# Patient Record
Sex: MALE | Race: WHITE | NOT HISPANIC OR LATINO | Employment: FULL TIME | URBAN - METROPOLITAN AREA
[De-identification: names, ages, dates, MRNs, and addresses within clinical notes are randomized per-mention and may not be internally consistent; named-entity substitution may affect disease eponyms.]

---

## 2019-02-12 ENCOUNTER — TRANSCRIBE ORDERS (OUTPATIENT)
Dept: ADMINISTRATIVE | Facility: HOSPITAL | Age: 49
End: 2019-02-12

## 2019-02-12 DIAGNOSIS — R10.11 RIGHT UPPER QUADRANT PAIN: Primary | ICD-10-CM

## 2019-02-21 ENCOUNTER — HOSPITAL ENCOUNTER (OUTPATIENT)
Dept: NUCLEAR MEDICINE | Facility: HOSPITAL | Age: 49
Discharge: HOME | End: 2019-02-21

## 2019-02-21 DIAGNOSIS — R10.11 RIGHT UPPER QUADRANT PAIN: ICD-10-CM

## 2019-02-21 PROCEDURE — 25010000002 SINCALIDE PER 5 MCG: Performed by: STUDENT IN AN ORGANIZED HEALTH CARE EDUCATION/TRAINING PROGRAM

## 2019-02-21 PROCEDURE — 78227 HEPATOBIL SYST IMAGE W/DRUG: CPT

## 2019-02-21 PROCEDURE — 0 TECHNETIUM TC 99M MEBROFENIN KIT: Performed by: STUDENT IN AN ORGANIZED HEALTH CARE EDUCATION/TRAINING PROGRAM

## 2019-02-21 PROCEDURE — A9537 TC99M MEBROFENIN: HCPCS | Performed by: STUDENT IN AN ORGANIZED HEALTH CARE EDUCATION/TRAINING PROGRAM

## 2019-02-21 RX ORDER — KIT FOR THE PREPARATION OF TECHNETIUM TC 99M MEBROFENIN 45 MG/10ML
1 INJECTION, POWDER, LYOPHILIZED, FOR SOLUTION INTRAVENOUS
Status: COMPLETED | OUTPATIENT
Start: 2019-02-21 | End: 2019-02-21

## 2019-02-21 RX ADMIN — SINCALIDE 2.5 MCG: 5 INJECTION, POWDER, LYOPHILIZED, FOR SOLUTION INTRAVENOUS at 09:35

## 2019-02-21 RX ADMIN — MEBROFENIN 1 DOSE: 45 INJECTION, POWDER, LYOPHILIZED, FOR SOLUTION INTRAVENOUS at 08:30

## 2019-04-15 ENCOUNTER — APPOINTMENT (OUTPATIENT)
Dept: PREADMISSION TESTING | Facility: HOSPITAL | Age: 49
End: 2019-04-15

## 2019-04-15 VITALS — WEIGHT: 281.97 LBS | HEIGHT: 74 IN | BODY MASS INDEX: 36.19 KG/M2

## 2019-04-15 LAB
ALBUMIN SERPL-MCNC: 4.2 G/DL (ref 3.5–5.2)
ALBUMIN/GLOB SERPL: 1.4 G/DL
ALP SERPL-CCNC: 73 U/L (ref 39–117)
ALT SERPL W P-5'-P-CCNC: 34 U/L (ref 1–41)
ANION GAP SERPL CALCULATED.3IONS-SCNC: 12 MMOL/L
AST SERPL-CCNC: 24 U/L (ref 1–40)
BILIRUB SERPL-MCNC: 0.4 MG/DL (ref 0.2–1.2)
BUN BLD-MCNC: 12 MG/DL (ref 6–20)
BUN/CREAT SERPL: 14 (ref 7–25)
CALCIUM SPEC-SCNC: 8.9 MG/DL (ref 8.6–10.5)
CHLORIDE SERPL-SCNC: 103 MMOL/L (ref 98–107)
CO2 SERPL-SCNC: 25 MMOL/L (ref 22–29)
CREAT BLD-MCNC: 0.86 MG/DL (ref 0.76–1.27)
DEPRECATED RDW RBC AUTO: 47.4 FL (ref 37–54)
ERYTHROCYTE [DISTWIDTH] IN BLOOD BY AUTOMATED COUNT: 13.9 % (ref 12.3–15.4)
GFR SERPL CREATININE-BSD FRML MDRD: 95 ML/MIN/1.73
GLOBULIN UR ELPH-MCNC: 3 GM/DL
GLUCOSE BLD-MCNC: 114 MG/DL (ref 65–99)
HCT VFR BLD AUTO: 46.9 % (ref 37.5–51)
HGB BLD-MCNC: 15.7 G/DL (ref 13–17.7)
MCH RBC QN AUTO: 31.3 PG (ref 26.6–33)
MCHC RBC AUTO-ENTMCNC: 33.5 G/DL (ref 31.5–35.7)
MCV RBC AUTO: 93.6 FL (ref 79–97)
PLATELET # BLD AUTO: 241 10*3/MM3 (ref 140–450)
PMV BLD AUTO: 9.2 FL (ref 6–12)
POTASSIUM BLD-SCNC: 4.1 MMOL/L (ref 3.5–5.2)
PROT SERPL-MCNC: 7.2 G/DL (ref 6–8.5)
RBC # BLD AUTO: 5.01 10*6/MM3 (ref 4.14–5.8)
SODIUM BLD-SCNC: 140 MMOL/L (ref 136–145)
WBC NRBC COR # BLD: 8.78 10*3/MM3 (ref 3.4–10.8)

## 2019-04-15 PROCEDURE — 80053 COMPREHEN METABOLIC PANEL: CPT | Performed by: SURGERY

## 2019-04-15 PROCEDURE — 36415 COLL VENOUS BLD VENIPUNCTURE: CPT

## 2019-04-15 PROCEDURE — 93010 ELECTROCARDIOGRAM REPORT: CPT | Performed by: INTERNAL MEDICINE

## 2019-04-15 PROCEDURE — 93005 ELECTROCARDIOGRAM TRACING: CPT

## 2019-04-15 PROCEDURE — 85027 COMPLETE CBC AUTOMATED: CPT | Performed by: SURGERY

## 2019-04-15 RX ORDER — CEFAZOLIN SODIUM 2 G/100ML
2 INJECTION, SOLUTION INTRAVENOUS ONCE
Status: CANCELLED | OUTPATIENT
Start: 2019-04-18

## 2019-04-15 RX ORDER — OMEPRAZOLE 20 MG/1
20 CAPSULE, DELAYED RELEASE ORAL DAILY
COMMUNITY

## 2019-04-15 NOTE — PAT
Patient to apply Chlorhexadine wipes  to surgical area (as instructed) the night before procedure and the AM of procedure. Wipes provided.    Bactroban given to patient along with verbal and written instructions to use the night before surgery. Patient verbalized understanding.

## 2019-04-18 ENCOUNTER — HOSPITAL ENCOUNTER (OUTPATIENT)
Facility: HOSPITAL | Age: 49
Setting detail: HOSPITAL OUTPATIENT SURGERY
Discharge: HOME | End: 2019-04-18
Attending: SURGERY | Admitting: SURGERY

## 2019-04-18 ENCOUNTER — ANESTHESIA EVENT (OUTPATIENT)
Dept: PERIOP | Facility: HOSPITAL | Age: 49
End: 2019-04-18

## 2019-04-18 ENCOUNTER — ANESTHESIA (OUTPATIENT)
Dept: PERIOP | Facility: HOSPITAL | Age: 49
End: 2019-04-18

## 2019-04-18 VITALS
RESPIRATION RATE: 18 BRPM | DIASTOLIC BLOOD PRESSURE: 84 MMHG | TEMPERATURE: 97.5 F | HEART RATE: 62 BPM | WEIGHT: 281 LBS | BODY MASS INDEX: 36.06 KG/M2 | OXYGEN SATURATION: 92 % | SYSTOLIC BLOOD PRESSURE: 129 MMHG | HEIGHT: 74 IN

## 2019-04-18 DIAGNOSIS — R10.11 RUQ PAIN: ICD-10-CM

## 2019-04-18 PROCEDURE — 25010000002 PROPOFOL 10 MG/ML EMULSION: Performed by: NURSE ANESTHETIST, CERTIFIED REGISTERED

## 2019-04-18 PROCEDURE — 25010000002 ONDANSETRON PER 1 MG: Performed by: NURSE ANESTHETIST, CERTIFIED REGISTERED

## 2019-04-18 PROCEDURE — 25010000002 FENTANYL CITRATE (PF) 100 MCG/2ML SOLUTION: Performed by: NURSE ANESTHETIST, CERTIFIED REGISTERED

## 2019-04-18 PROCEDURE — 88304 TISSUE EXAM BY PATHOLOGIST: CPT | Performed by: SURGERY

## 2019-04-18 PROCEDURE — 25010000002 MIDAZOLAM PER 1 MG: Performed by: NURSE ANESTHETIST, CERTIFIED REGISTERED

## 2019-04-18 PROCEDURE — 25010000002 DEXAMETHASONE PER 1 MG: Performed by: NURSE ANESTHETIST, CERTIFIED REGISTERED

## 2019-04-18 PROCEDURE — 25010000002 NEOSTIGMINE 10 MG/10ML SOLUTION: Performed by: NURSE ANESTHETIST, CERTIFIED REGISTERED

## 2019-04-18 RX ORDER — FAMOTIDINE 20 MG/1
20 TABLET, FILM COATED ORAL
Status: COMPLETED | OUTPATIENT
Start: 2019-04-18 | End: 2019-04-18

## 2019-04-18 RX ORDER — PROPOFOL 10 MG/ML
VIAL (ML) INTRAVENOUS AS NEEDED
Status: DISCONTINUED | OUTPATIENT
Start: 2019-04-18 | End: 2019-04-18 | Stop reason: SURG

## 2019-04-18 RX ORDER — ONDANSETRON 2 MG/ML
4 INJECTION INTRAMUSCULAR; INTRAVENOUS ONCE AS NEEDED
Status: DISCONTINUED | OUTPATIENT
Start: 2019-04-18 | End: 2019-04-18 | Stop reason: HOSPADM

## 2019-04-18 RX ORDER — FENTANYL CITRATE 50 UG/ML
50 INJECTION, SOLUTION INTRAMUSCULAR; INTRAVENOUS
Status: DISCONTINUED | OUTPATIENT
Start: 2019-04-18 | End: 2019-04-18 | Stop reason: HOSPADM

## 2019-04-18 RX ORDER — HYDROMORPHONE HYDROCHLORIDE 1 MG/ML
0.5 INJECTION, SOLUTION INTRAMUSCULAR; INTRAVENOUS; SUBCUTANEOUS
Status: DISCONTINUED | OUTPATIENT
Start: 2019-04-18 | End: 2019-04-18 | Stop reason: HOSPADM

## 2019-04-18 RX ORDER — GLYCOPYRROLATE 0.2 MG/ML
INJECTION INTRAMUSCULAR; INTRAVENOUS AS NEEDED
Status: DISCONTINUED | OUTPATIENT
Start: 2019-04-18 | End: 2019-04-18 | Stop reason: SURG

## 2019-04-18 RX ORDER — SODIUM CHLORIDE, SODIUM LACTATE, POTASSIUM CHLORIDE, CALCIUM CHLORIDE 600; 310; 30; 20 MG/100ML; MG/100ML; MG/100ML; MG/100ML
9 INJECTION, SOLUTION INTRAVENOUS CONTINUOUS PRN
Status: DISCONTINUED | OUTPATIENT
Start: 2019-04-18 | End: 2019-04-18 | Stop reason: HOSPADM

## 2019-04-18 RX ORDER — LIDOCAINE HYDROCHLORIDE 10 MG/ML
0.5 INJECTION, SOLUTION EPIDURAL; INFILTRATION; INTRACAUDAL; PERINEURAL ONCE AS NEEDED
Status: COMPLETED | OUTPATIENT
Start: 2019-04-18 | End: 2019-04-18

## 2019-04-18 RX ORDER — NEOSTIGMINE METHYLSULFATE 1 MG/ML
INJECTION, SOLUTION INTRAVENOUS AS NEEDED
Status: DISCONTINUED | OUTPATIENT
Start: 2019-04-18 | End: 2019-04-18 | Stop reason: SURG

## 2019-04-18 RX ORDER — HYDROCODONE BITARTRATE AND ACETAMINOPHEN 5; 325 MG/1; MG/1
1-2 TABLET ORAL EVERY 6 HOURS PRN
Qty: 20 TABLET | Refills: 0 | Status: SHIPPED | OUTPATIENT
Start: 2019-04-18

## 2019-04-18 RX ORDER — MIDAZOLAM HYDROCHLORIDE 1 MG/ML
INJECTION INTRAMUSCULAR; INTRAVENOUS AS NEEDED
Status: DISCONTINUED | OUTPATIENT
Start: 2019-04-18 | End: 2019-04-18 | Stop reason: SURG

## 2019-04-18 RX ORDER — SODIUM CHLORIDE 9 MG/ML
INJECTION, SOLUTION INTRAVENOUS AS NEEDED
Status: DISCONTINUED | OUTPATIENT
Start: 2019-04-18 | End: 2019-04-18 | Stop reason: HOSPADM

## 2019-04-18 RX ORDER — LIDOCAINE HYDROCHLORIDE 20 MG/ML
INJECTION, SOLUTION INFILTRATION; PERINEURAL AS NEEDED
Status: DISCONTINUED | OUTPATIENT
Start: 2019-04-18 | End: 2019-04-18 | Stop reason: SURG

## 2019-04-18 RX ORDER — SODIUM CHLORIDE 0.9 % (FLUSH) 0.9 %
3-10 SYRINGE (ML) INJECTION AS NEEDED
Status: DISCONTINUED | OUTPATIENT
Start: 2019-04-18 | End: 2019-04-18 | Stop reason: HOSPADM

## 2019-04-18 RX ORDER — FENTANYL CITRATE 50 UG/ML
INJECTION, SOLUTION INTRAMUSCULAR; INTRAVENOUS AS NEEDED
Status: DISCONTINUED | OUTPATIENT
Start: 2019-04-18 | End: 2019-04-18 | Stop reason: SURG

## 2019-04-18 RX ORDER — BUPIVACAINE HYDROCHLORIDE 5 MG/ML
INJECTION, SOLUTION PERINEURAL AS NEEDED
Status: DISCONTINUED | OUTPATIENT
Start: 2019-04-18 | End: 2019-04-18 | Stop reason: HOSPADM

## 2019-04-18 RX ORDER — DEXAMETHASONE SODIUM PHOSPHATE 10 MG/ML
INJECTION INTRAMUSCULAR; INTRAVENOUS AS NEEDED
Status: DISCONTINUED | OUTPATIENT
Start: 2019-04-18 | End: 2019-04-18 | Stop reason: SURG

## 2019-04-18 RX ORDER — CEFAZOLIN SODIUM IN 0.9 % NACL 3 G/100 ML
3 INTRAVENOUS SOLUTION, PIGGYBACK (ML) INTRAVENOUS ONCE
Status: COMPLETED | OUTPATIENT
Start: 2019-04-18 | End: 2019-04-18

## 2019-04-18 RX ORDER — SIMETHICONE 80 MG
80 TABLET,CHEWABLE ORAL ONCE
Status: COMPLETED | OUTPATIENT
Start: 2019-04-18 | End: 2019-04-18

## 2019-04-18 RX ORDER — DEXAMETHASONE SODIUM PHOSPHATE 4 MG/ML
8 INJECTION, SOLUTION INTRA-ARTICULAR; INTRALESIONAL; INTRAMUSCULAR; INTRAVENOUS; SOFT TISSUE ONCE AS NEEDED
Status: DISCONTINUED | OUTPATIENT
Start: 2019-04-18 | End: 2019-04-18 | Stop reason: HOSPADM

## 2019-04-18 RX ORDER — OXYCODONE HYDROCHLORIDE AND ACETAMINOPHEN 5; 325 MG/1; MG/1
1 TABLET ORAL ONCE AS NEEDED
Status: COMPLETED | OUTPATIENT
Start: 2019-04-18 | End: 2019-04-18

## 2019-04-18 RX ORDER — SODIUM CHLORIDE 0.9 % (FLUSH) 0.9 %
3 SYRINGE (ML) INJECTION EVERY 12 HOURS SCHEDULED
Status: DISCONTINUED | OUTPATIENT
Start: 2019-04-18 | End: 2019-04-18 | Stop reason: HOSPADM

## 2019-04-18 RX ORDER — ROCURONIUM BROMIDE 10 MG/ML
INJECTION, SOLUTION INTRAVENOUS AS NEEDED
Status: DISCONTINUED | OUTPATIENT
Start: 2019-04-18 | End: 2019-04-18 | Stop reason: SURG

## 2019-04-18 RX ORDER — ONDANSETRON 2 MG/ML
INJECTION INTRAMUSCULAR; INTRAVENOUS AS NEEDED
Status: DISCONTINUED | OUTPATIENT
Start: 2019-04-18 | End: 2019-04-18 | Stop reason: SURG

## 2019-04-18 RX ADMIN — FENTANYL CITRATE 50 MCG: 50 INJECTION, SOLUTION INTRAMUSCULAR; INTRAVENOUS at 14:45

## 2019-04-18 RX ADMIN — PROPOFOL 100 MG: 10 INJECTION, EMULSION INTRAVENOUS at 14:06

## 2019-04-18 RX ADMIN — FENTANYL CITRATE 50 MCG: 50 INJECTION, SOLUTION INTRAMUSCULAR; INTRAVENOUS at 13:47

## 2019-04-18 RX ADMIN — FAMOTIDINE 20 MG: 20 TABLET ORAL at 12:10

## 2019-04-18 RX ADMIN — FENTANYL CITRATE 50 MCG: 50 INJECTION, SOLUTION INTRAMUSCULAR; INTRAVENOUS at 13:38

## 2019-04-18 RX ADMIN — GLYCOPYRROLATE 0.4 MG: 0.2 INJECTION, SOLUTION INTRAMUSCULAR; INTRAVENOUS at 14:17

## 2019-04-18 RX ADMIN — SODIUM CHLORIDE, POTASSIUM CHLORIDE, SODIUM LACTATE AND CALCIUM CHLORIDE 9 ML/HR: 600; 310; 30; 20 INJECTION, SOLUTION INTRAVENOUS at 12:18

## 2019-04-18 RX ADMIN — EPHEDRINE SULFATE 10 MG: 50 INJECTION INTRAMUSCULAR; INTRAVENOUS; SUBCUTANEOUS at 13:31

## 2019-04-18 RX ADMIN — LIDOCAINE HYDROCHLORIDE 50 MG: 20 INJECTION, SOLUTION INFILTRATION; PERINEURAL at 13:14

## 2019-04-18 RX ADMIN — LIDOCAINE HYDROCHLORIDE 0.3 ML: 10 INJECTION, SOLUTION EPIDURAL; INFILTRATION; INTRACAUDAL; PERINEURAL at 12:10

## 2019-04-18 RX ADMIN — MIDAZOLAM HYDROCHLORIDE 2 MG: 1 INJECTION, SOLUTION INTRAMUSCULAR; INTRAVENOUS at 13:10

## 2019-04-18 RX ADMIN — SIMETHICONE CHEW TAB 80 MG 80 MG: 80 TABLET ORAL at 15:30

## 2019-04-18 RX ADMIN — PROPOFOL 200 MG: 10 INJECTION, EMULSION INTRAVENOUS at 13:14

## 2019-04-18 RX ADMIN — DEXAMETHASONE SODIUM PHOSPHATE 8 MG: 10 INJECTION INTRAMUSCULAR; INTRAVENOUS at 13:22

## 2019-04-18 RX ADMIN — ROCURONIUM BROMIDE 5 MG: 10 INJECTION INTRAVENOUS at 13:35

## 2019-04-18 RX ADMIN — ROCURONIUM BROMIDE 5 MG: 10 INJECTION INTRAVENOUS at 13:47

## 2019-04-18 RX ADMIN — ROCURONIUM BROMIDE 40 MG: 10 INJECTION INTRAVENOUS at 13:14

## 2019-04-18 RX ADMIN — NEOSTIGMINE METHYLSULFATE 2.5 MG: 1 INJECTION, SOLUTION INTRAVENOUS at 14:17

## 2019-04-18 RX ADMIN — ONDANSETRON 4 MG: 2 INJECTION INTRAMUSCULAR; INTRAVENOUS at 13:25

## 2019-04-18 RX ADMIN — OXYCODONE AND ACETAMINOPHEN 1 TABLET: 5; 325 TABLET ORAL at 15:30

## 2019-04-18 RX ADMIN — Medication 3 G: at 13:10

## 2019-04-18 NOTE — OP NOTE
General Surgery Operative Note    Osbaldo Holt  [id number]  1970    Date of Surgery:  4/18/2019 2:31 PM    Pre-op Diagnosis: Biliary dyskinesia     Chronic cholecystitis    Post-op Diagnosis: Biliary dyskinesia     Chronic cholecystitis    Procedure: Laparoscopic cholecystectomy    Surgeon: Sonido Shelton MD    Assistant: None    Anesthesia: General    Fluids: 800 mL of crystalloid    Estimated Blood Loss: Less than 25 mL    Urine Voided: Not recorded     Specimens:  Gallbladder                  Drains: None    Findings: Chronic cholecystitis    Complications: None apparent    History: 48-year-old gentleman presented to the office with postprandial abdominal discomfort and an abnormal HIDA scan.       I rehashed the risks and benefits of cholecystectomy, including but not limited to: bleeding, infection, injury to adjacent viscera (small bowel, large bowel, duodenum, common bile duct, hepatic artery, the liver), bile leak, retained stones, no change in preoperative symptomatology, need for ERCP, an open operation and general, and medical issues from a cardiopulmonary venous thrombosis standpoint.    Procedure:      After informed consent the patient was taken to the operating room.  They were placed in the supine position on the operating table, general anesthesia administered, and the abdomen was then prepped and draped in the standard sterile fashion.  A timeout was performed.      The operation began with a periumbilical Raghavendra trocar cutdown.  The skin was anesthetized and incised, the peritoneum entered sharply under direct visualization, bilateral 0 Vicryl fascial sutures were placed, and the blunt-tipped Raghavendra trocar was placed intra-abdominally.  The abdomen was insufflated and the patient was placed in the head up position and rolled slightly onto the left hand side.  I then placed a subxiphoid port and two 5 mm trocars in the right upper quadrant under direct visualization.     The  gallbladder appeared chronically scarred and had relatively dense attachments of the omentum to the gallbladder fundus.  The surrounding omentum was freed from the liver edge and gallbladder itself using a combination of blunt and electrocautery dissection.  The fundus of the gallbladder was then retracted cephalad up over the liver edge.  The neck of the gallbladder was then retracted inferior laterally.  The peritoneal attachments to the cystic duct and cystic artery were stripped down and a meticulous dissection demonstrated the critical view.  I then placed 3 clips proximally and one distally on the cystic duct, 2 clips proximally on the cystic artery and one distally.  The laparoscopic scissors were then used to transect these structures and removed the gallbladder from the gallbladder bed.  I placed the gallbladder into an Endo Catch bag and withdrew this from the abdomen.  I reinspected my clips, they were without bile leak or bleeding and the gallbladder fossa was similar. I inspected my umbilical port site and this was without obvious complication.  All trocars were removed under direct visualization without bleeding.  The periumbilical fascial defect was closed with 0 Vicryl.  All skin incisions were closed with 4-0 Monocryl, Mastisol, Steri-Strips, Telfa, and Tegaderms.       The lap and needle count was correct at the end of the procedure ×2. The patient was then transferred to the recovery room in stable condition.   Sonido Shelton MD     Date: 4/18/2019  Time: 2:31 PM

## 2019-04-18 NOTE — ANESTHESIA POSTPROCEDURE EVALUATION
Patient: Osbaldo Holt    Procedure Summary     Date:  04/18/19 Room / Location:   [facility] / [facility]    Anesthesia Start:  1310 Anesthesia Stop:  1434    Procedure:  CHOLECYSTECTOMY LAPAROSCOPIC (N/A Abdomen) Diagnosis:      Surgeon:  Sonido Shelton MD Provider:  Patrick Sneed MD    Anesthesia Type:  general ASA Status:  2          Anesthesia Type: general  Last vitals  BP   121/82 (04/18/19 1206)   Temp   98.4 °F (36.9 °C) (04/18/19 1206)   Pulse   78 (04/18/19 1206)   Resp   18 (04/18/19 1206)     SpO2   94 % (04/18/19 1206)     Post Anesthesia Care and Evaluation    Patient location during evaluation: PACU  Patient participation: complete - patient participated  Level of consciousness: awake and alert  Pain score: 0  Pain management: adequate  Airway patency: patent  Anesthetic complications: No anesthetic complications  PONV Status: none  Cardiovascular status: hemodynamically stable and acceptable  Respiratory status: nonlabored ventilation, acceptable and nasal cannula  Hydration status: acceptable

## 2019-04-18 NOTE — ANESTHESIA PREPROCEDURE EVALUATION
Anesthesia Evaluation                  Airway   Mallampati: II  Dental      Pulmonary    (+) COPD,   Cardiovascular         Neuro/Psych  GI/Hepatic/Renal/Endo      Musculoskeletal     Abdominal    Substance History      OB/GYN          Other                        Anesthesia Plan    ASA 2     general     intravenous induction   Anesthetic plan, all risks, benefits, and alternatives have been provided, discussed and informed consent has been obtained with: patient.

## 2019-04-18 NOTE — ANESTHESIA PROCEDURE NOTES
Airway  Urgency: elective    Airway not difficult    General Information and Staff    Patient location during procedure: OR    Indications and Patient Condition  Indications for airway management: airway protection    Preoxygenated: yes  Mask difficulty assessment: 1 - vent by mask    Final Airway Details  Final airway type: endotracheal airway      Successful airway: ETT  Cuffed: yes   Successful intubation technique: direct laryngoscopy  Facilitating devices/methods: intubating stylet  Endotracheal tube insertion site: oral  Blade: Dasilva  Blade size: 2  ETT size (mm): 7.5  Cormack-Lehane Classification: grade IIa - partial view of glottis  Placement verified by: chest auscultation and capnometry   Measured from: lips  ETT to lips (cm): 23  Number of attempts at approach: 1

## 2019-04-18 NOTE — H&P
"Pre-Op H&P  Osbaldo Holt  [id number]  1970    Chief complaint: RUQ/epigastric pain    HPI:    Patient is a 48 y.o.male who presented with RUQ/epigastric pain, +fatty food intolerance and imaging including HIDA revealed EF 11% with no reproduction of symptoms.  Here today for laparoscopic cholecystectomy possible open.    Review of Systems:  General ROS: negative for chills, fever or skin lesions;  No changes since last office visit  Cardiovascular ROS: no chest pain or dyspnea on exertion.  Negative activity intolerance  Respiratory ROS: no cough, shortness of breath, or wheezing    Allergies: No Known Allergies    Home Meds:    No current facility-administered medications on file prior to encounter.      No current outpatient medications on file prior to encounter.       PMH:   Past Medical History:   Diagnosis Date   • COPD (chronic obstructive pulmonary disease) (CMS/HCC)    • GERD (gastroesophageal reflux disease)    • MRSA infection     10 years ago after spider bite    • Wears dentures      PSH:    Past Surgical History:   Procedure Laterality Date   • COLONOSCOPY     • ELBOW PROCEDURE Right    • HAND SURGERY Left     from spider bite   • WRIST SURGERY Right      Social History:   Tobacco:   Social History     Tobacco Use   Smoking Status Current Every Day Smoker   • Packs/day: 0.50   • Types: Cigarettes   Smokeless Tobacco Never Used      Alcohol:     Social History     Substance and Sexual Activity   Alcohol Use Yes   • Alcohol/week: 10.8 oz   • Types: 18 Cans of beer per week       Vitals:           /82 (BP Location: Right arm, Patient Position: Lying)   Pulse 78   Temp 98.4 °F (36.9 °C) (Temporal)   Resp 18   Ht 188 cm (74\")   Wt 127 kg (281 lb)   SpO2 94%   BMI 36.08 kg/m²     Physical Exam:  General Appearance:    Alert, cooperative, no distress, appears stated age   Head:    Normocephalic, without obvious abnormality, atraumatic   Lungs:     Clear to auscultation bilaterally, " respirations unlabored    Heart:   Regular rate and rhythm, S1 and S2 normal, no murmur, rub    or gallop    Abdomen:    Soft, +bowel sounds   Breast Exam:    deferred   Genitalia:    deferred   Extremities:   Extremities normal, atraumatic, no cyanosis or edema   Skin:   Skin color, texture, turgor normal, no rashes or lesions   Neurologic:   Grossly intact   Results Review  LABS:  Lab Results   Component Value Date    WBC 8.78 04/15/2019    HGB 15.7 04/15/2019    HCT 46.9 04/15/2019    MCV 93.6 04/15/2019     04/15/2019    GLUCOSE 114 (H) 04/15/2019    BUN 12 04/15/2019    CREATININE 0.86 04/15/2019    EGFRIFNONA 95 04/15/2019     04/15/2019    K 4.1 04/15/2019     04/15/2019    CO2 25.0 04/15/2019    CALCIUM 8.9 04/15/2019    ALBUMIN 4.20 04/15/2019    AST 24 04/15/2019    ALT 34 04/15/2019    BILITOT 0.4 04/15/2019       RADIOLOGY:  Imaging Results (last 72 hours)     ** No results found for the last 72 hours. **          I reviewed the patient's new clinical results.    Cancer Staging (if applicable)  Cancer Patient: __ yes _x_no __unknown; If yes, clinical stage T:__ N:__M:__, stage group or __N/A    Impression: Biliary dyskinesia    Plan: Laparoscopic cholecystectomy possible open    Betsy Simpson, APRN   4/18/2019   12:33 PM     I have reviewed the above note, my prior note(s), appropriate imaging, and labs.  I have again discussed the risks and benefits of lap edyta possible open with IOC with the patient.  All of their questions have been answered.  They understand and wish to proceed with surgical intervention.    CBC  Results from last 7 days   Lab Units 04/15/19  1539   WBC 10*3/mm3 8.78   HEMOGLOBIN g/dL 15.7   HEMATOCRIT % 46.9   PLATELETS 10*3/mm3 241       CMP  Results from last 7 days   Lab Units 04/15/19  1539   SODIUM mmol/L 140   POTASSIUM mmol/L 4.1   CHLORIDE mmol/L 103   CO2 mmol/L 25.0   BUN mg/dL 12   CREATININE mg/dL 0.86   CALCIUM mg/dL 8.9   BILIRUBIN mg/dL 0.4    ALK PHOS U/L 73   ALT (SGPT) U/L 34   AST (SGOT) U/L 24   GLUCOSE mg/dL 114*

## 2019-04-19 LAB
CYTO UR: NORMAL
LAB AP CASE REPORT: NORMAL
LAB AP CLINICAL INFORMATION: NORMAL
PATH REPORT.FINAL DX SPEC: NORMAL
PATH REPORT.GROSS SPEC: NORMAL

## 2019-08-19 ENCOUNTER — HOSPITAL ENCOUNTER (OUTPATIENT)
Dept: GENERAL RADIOLOGY | Facility: HOSPITAL | Age: 49
Discharge: HOME | End: 2019-08-19
Admitting: STUDENT IN AN ORGANIZED HEALTH CARE EDUCATION/TRAINING PROGRAM

## 2019-08-19 ENCOUNTER — TRANSCRIBE ORDERS (OUTPATIENT)
Dept: ADMINISTRATIVE | Facility: HOSPITAL | Age: 49
End: 2019-08-19

## 2019-08-19 DIAGNOSIS — R52 ACUTE PAIN: Primary | ICD-10-CM

## 2019-08-19 PROCEDURE — 73110 X-RAY EXAM OF WRIST: CPT

## 2019-08-23 ENCOUNTER — TRANSCRIBE ORDERS (OUTPATIENT)
Dept: ADMINISTRATIVE | Facility: HOSPITAL | Age: 49
End: 2019-08-23

## 2019-08-23 DIAGNOSIS — M25.531 ACUTE WRIST PAIN, RIGHT: Primary | ICD-10-CM

## 2019-08-29 ENCOUNTER — HOSPITAL ENCOUNTER (OUTPATIENT)
Dept: MRI IMAGING | Facility: HOSPITAL | Age: 49
Discharge: HOME | End: 2019-08-29
Admitting: STUDENT IN AN ORGANIZED HEALTH CARE EDUCATION/TRAINING PROGRAM

## 2019-08-29 DIAGNOSIS — M25.531 ACUTE WRIST PAIN, RIGHT: ICD-10-CM

## 2019-08-29 PROCEDURE — 73221 MRI JOINT UPR EXTREM W/O DYE: CPT

## 2024-06-18 ENCOUNTER — TELEPHONE (OUTPATIENT)
Dept: PAIN MEDICINE | Facility: CLINIC | Age: 54
End: 2024-06-18
Payer: COMMERCIAL

## 2024-06-26 ENCOUNTER — OFFICE VISIT (OUTPATIENT)
Dept: PAIN MEDICINE | Facility: CLINIC | Age: 54
End: 2024-06-26
Payer: COMMERCIAL

## 2024-06-26 VITALS — WEIGHT: 246 LBS | HEIGHT: 73 IN | BODY MASS INDEX: 32.6 KG/M2

## 2024-06-26 DIAGNOSIS — T85.192A FAILURE OF SPINAL CORD STIMULATOR, INITIAL ENCOUNTER: Primary | ICD-10-CM

## 2024-06-26 DIAGNOSIS — M79.2 PERIPHERAL NEUROPATHIC PAIN: ICD-10-CM

## 2024-06-26 DIAGNOSIS — Z00.8 PRE-SURGICAL PSYCHOLOGICAL ASSESSMENT, ENCOUNTER FOR: Primary | ICD-10-CM

## 2024-06-26 PROCEDURE — 99204 OFFICE O/P NEW MOD 45 MIN: CPT | Performed by: STUDENT IN AN ORGANIZED HEALTH CARE EDUCATION/TRAINING PROGRAM

## 2024-06-26 RX ORDER — CYCLOBENZAPRINE HCL 10 MG
1 TABLET ORAL 3 TIMES DAILY PRN
COMMUNITY
Start: 2024-05-09

## 2024-06-26 RX ORDER — METFORMIN HYDROCHLORIDE 500 MG/1
TABLET, EXTENDED RELEASE ORAL
COMMUNITY
Start: 2024-04-25

## 2024-06-26 RX ORDER — CELECOXIB 200 MG/1
200 CAPSULE ORAL
COMMUNITY
Start: 2024-05-09

## 2024-06-26 RX ORDER — FLUTICASONE FUROATE, UMECLIDINIUM BROMIDE AND VILANTEROL TRIFENATATE 100; 62.5; 25 UG/1; UG/1; UG/1
POWDER RESPIRATORY (INHALATION)
COMMUNITY
Start: 2024-04-01

## 2024-06-26 RX ORDER — ATORVASTATIN CALCIUM 20 MG/1
TABLET, FILM COATED ORAL
COMMUNITY

## 2024-06-26 RX ORDER — PREGABALIN 300 MG/1
1 CAPSULE ORAL 2 TIMES DAILY
COMMUNITY

## 2024-06-26 RX ORDER — ALBUTEROL SULFATE 2.5 MG/3ML
SOLUTION RESPIRATORY (INHALATION)
COMMUNITY

## 2024-06-26 RX ORDER — ALBUTEROL SULFATE 90 UG/1
AEROSOL, METERED RESPIRATORY (INHALATION)
COMMUNITY

## 2024-06-26 RX ORDER — DULOXETIN HYDROCHLORIDE 60 MG/1
1 CAPSULE, DELAYED RELEASE ORAL DAILY
COMMUNITY

## 2024-06-26 RX ORDER — EMPAGLIFLOZIN 25 MG/1
25 TABLET, FILM COATED ORAL DAILY
COMMUNITY

## 2024-06-26 RX ORDER — CLINDAMYCIN HYDROCHLORIDE 300 MG/1
CAPSULE ORAL
COMMUNITY

## 2024-06-26 NOTE — PROGRESS NOTES
Referring Physician: Jonatan Shane MD  [address]  [address]  [place] [address]    Primary Physician: Pati Welch MD    CHIEF COMPLAINT or REASON FOR VISIT: DRG needs revision (New patient) and Back Pain      Initial history of present illness on 06/26/2024:  Mr. Osbaldo Holt is 53 y.o. male who presents as a new patient referral for evaluation and treatment of bilateral foot numbness tingling pain.  Mr. Wells first developed bilateral forefoot and toes paresthesias and burning sensation in approximately 2020/2021 without any inciting or trauma.  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  He underwent evaluation at the time with Dr. Lane, pain management in [place].  He did apparently undergo EMG/NCV in [facility] as well.  He subsequently underwent dorsal root ganglion trial of bilateral L5, S1 nerve roots with Dr. Lane which he states provided good relief of his symptoms.  He is not diabetic.  He has taken pregabalin, Cymbalta with modest benefit.  He subsequently underwent bilateral L5, S1 dorsal root ganglion stimulator permanent implant in 2022 with Dr. Lane.  He reports some good relief after this implant for short few months until he lost efficacy.  Subsequent imaging demonstrated migration of bilateral S1 leads.  Patient then recently followed up with Dr. Roger Shane, pain management, where [employer] device representatives did turn back on his bilateral L5 leads which has produced some improvement in his symptoms however the patient would like better relief.  Dr. Shane referred to me for consideration of DRG revision.    Interval history:    Interventions:      Objective Pain Scoring:   BRIEF PAIN INVENTORY:  Total score:   Pain Score    06/26/24 0732   PainSc:   7   PainLoc: Back      PHQ-2: PHQ-2 Total Score: 2  PHQ-9: PHQ-9: Brief Depression Severity Measure Score: 13  Opioid Risk Tool:         Review of Systems:   ROS negative  except as otherwise noted     Past Medical History:   Past Medical History:   Diagnosis Date    COPD (chronic obstructive pulmonary disease)     GERD (gastroesophageal reflux disease)     MRSA infection     10 years ago after spider bite     Wears dentures          Past Surgical History:   Past Surgical History:   Procedure Laterality Date    CHOLECYSTECTOMY N/A 4/18/2019    Procedure: CHOLECYSTECTOMY LAPAROSCOPIC;  Surgeon: Sonido Shelton MD;  Location: [facility];  Service: General    COLONOSCOPY      ELBOW PROCEDURE Right     HAND SURGERY Left     from spider bite    WRIST SURGERY Right          Family History   No family history on file.      Social History   Social History     Socioeconomic History    Marital status:    Tobacco Use    Smoking status: Every Day     Current packs/day: 0.50     Types: Cigarettes    Smokeless tobacco: Never   Vaping Use    Vaping status: Some Days    Substances: Nicotine    Devices: Disposable   Substance and Sexual Activity    Alcohol use: Yes     Alcohol/week: 18.0 standard drinks of alcohol     Types: 18 Cans of beer per week    Drug use: No    Sexual activity: Defer        Medications:     Current Outpatient Medications:     albuterol (PROVENTIL) (2.5 MG/3ML) 0.083% nebulizer solution, INHALE 1 UNIT BY NEBULIZER EVERY 8 HOURS AS NEEDED, Disp: , Rfl:     albuterol sulfate  (90 Base) MCG/ACT inhaler, INHALE 2 PUFFS INTO LUNGS EVERY 4 HOURS AS NEEDED SHORTNESS OF BREATH, Disp: , Rfl:     atorvastatin (LIPITOR) 20 MG tablet, Take  by mouth., Disp: , Rfl:     celecoxib (CeleBREX) 200 MG capsule, Take 1 capsule by mouth., Disp: , Rfl:     clindamycin (CLEOCIN) 300 MG capsule, TAKE 1 CAPSULE ORAL ROUTE 4 TIMES PER DAY FOR 7 DAYS, Disp: , Rfl:     cyclobenzaprine (FLEXERIL) 10 MG tablet, Take 1 tablet by mouth 3 (Three) Times a Day As Needed., Disp: , Rfl:     DULoxetine (CYMBALTA) 60 MG capsule, Take 1 capsule by mouth Daily., Disp: , Rfl:     glucose blood test  "strip, 4x/day, Please provide strips compatible with patient's meter and insurance, Disp: , Rfl:     Jardiance 25 MG tablet tablet, Take 1 tablet by mouth Daily., Disp: , Rfl:     metFORMIN ER (GLUCOPHAGE-XR) 500 MG 24 hr tablet, TAKE TWO TABLETS BY MOUTH TWICE DAILY WITH MEALS DO NOT CRUSH CHEW OR EXPECTORATE, Disp: , Rfl:     omeprazole (priLOSEC) 20 MG capsule, Take 1 capsule by mouth Daily., Disp: , Rfl:     pregabalin (LYRICA) 300 MG capsule, Take 1 capsule by mouth 2 (Two) Times a Day., Disp: , Rfl:     Trelegy Ellipta 100-62.5-25 MCG/ACT inhaler, INHALE 1 PUFF INTO LUNGS EVERY DAY FOR COPD, Disp: , Rfl:     HYDROcodone-acetaminophen (NORCO) 5-325 MG per tablet, Take 1-2 tablets by mouth Every 6 (Six) Hours As Needed (Pain). (Patient not taking: Reported on 6/26/2024), Disp: 20 tablet, Rfl: 0        Physical Exam:     Vitals:    06/26/24 0732   Weight: 112 kg (246 lb)   Height: 185.4 cm (73\")   PainSc:   7   PainLoc: Back        General: Alert and oriented, No acute distress.   HEENT: Normocephalic, atraumatic.   Cardiovascular: No gross edema  Respiratory: Respirations are non-labored  There is a right flank scar and IPG    Sensory Exam: Patient does have full sensation in bilateral forefeet however complains of paresthesias/dysesthesia in all toes and bilateral forefeet worse on the plantar surface.      Neurologic: Cranial Nerves II-XII are grossly intact.     Psychiatric: Cooperative.   Gait: Normal   Assistive Devices: None    Imaging Studies:   No results found for this or any previous visit.        Independent review of radiographic imaging:     Impression & Plan:       06/26/2024: Osbaldo Holt is a 53 y.o. male with past medical history significant for COPD, GERD, bilateral L5, S1 DRG implant complicated by lead migration, peripheral neuropathic pain who presents to the pain clinic for evaluation and treatment of spinal stimulator implant malfunction.  I did personally review images which demonstrated " complete migration of the left S1 stimulator lead into the subcutaneous tissue and advancement of the right S1 stimulator lead into the pelvis.  I presented the patient with the following options:   Reprogramming of current device L5 leads and leaving it as is (of note current lead position outside of the epidural space is NOT MRI compatible)  Revision of the DRG system which would necessitate removing both S1 leads, replacing both S1 leads, accessing IPG site  Dorsal column spinal stimulator trial with HF 10 therapy.  If trial is successful would simultaneously remove DRG system and replace with dorsal column system.    Patient elected to proceed with option #3.  I will therefore obtain psychiatric clearance for dorsal column stimulator trial.  1 week prior to HF 10 trial I will have the patient turn off his dorsal root ganglion stimulator device to reestablish baseline.  I had a discussion with the patient regarding the risks of the procedure including bleeding, infection, damage to surrounding structures.    1. Failure of spinal cord stimulator, initial encounter    2. Peripheral neuropathic pain        PLAN:  1. Medication Recommendations: Recommend Voltaren topical, NSAIDs, Tylenol.  Can trial turmeric 500 mg twice daily if NSAID contraindicated.    2. Physical Therapy: Continue HEP    3. Psychological: Referral to [facility] behavioral for psychiatric clearance for dorsal column spinal stimulator trial with Nevro    4. Complementary and alternative (CAM) Therapies:     5. Labs/Diagnostic studies: None indicated     6. Imaging: Radiographs reviewed    7. Interventions: After psych clearance will schedule spinal cord stimulator trial with Nevro ([id number] x 2).  Patient counseled to turn off DRG 1 week prior to trial    8. Referrals: [facility]    9. Records: Obtain EMG records from [facility]    10. Lifestyle goals:    Follow-up 1 month      [facility]  Jordan Holbrook MD          Quality Metrics:

## 2024-07-22 ENCOUNTER — TELEPHONE (OUTPATIENT)
Dept: PAIN MEDICINE | Facility: CLINIC | Age: 54
End: 2024-07-22
Payer: COMMERCIAL

## 2024-07-22 DIAGNOSIS — T85.192A FAILURE OF SPINAL CORD STIMULATOR, INITIAL ENCOUNTER: Primary | ICD-10-CM

## 2024-07-22 NOTE — TELEPHONE ENCOUNTER
Called patient on 7/19/2024 and scheduled him for scs trial with Cadyro on 9/12/2024, and lead pull on 9/16?2024.     Patient said that recent changes to his device made his pain worse. I advised him to call the [employer] reps. Advised patient to turn off DRG one week before procedure.     Patient wants to know if anything further can be done for his pain while he awaits his SCS trial.

## 2024-07-23 ENCOUNTER — PATIENT MESSAGE (OUTPATIENT)
Dept: PAIN MEDICINE | Facility: CLINIC | Age: 54
End: 2024-07-23
Payer: COMMERCIAL

## 2024-07-23 ENCOUNTER — TELEPHONE (OUTPATIENT)
Dept: PAIN MEDICINE | Facility: CLINIC | Age: 54
End: 2024-07-23
Payer: COMMERCIAL

## 2024-07-23 NOTE — TELEPHONE ENCOUNTER
Patient called the office, I advised him that Dr Holbrook cannot prescribe any pain medication at this time, and let him know that if need be, he can contact his PCP.    Reminded patient of dates/times of his SCS trial with Srinivas and lead pull appointment. Mailed patient appointment cards and map.

## 2024-07-24 ENCOUNTER — OFFICE VISIT (OUTPATIENT)
Dept: PAIN MEDICINE | Facility: CLINIC | Age: 54
End: 2024-07-24
Payer: COMMERCIAL

## 2024-07-24 VITALS — BODY MASS INDEX: 32.74 KG/M2 | WEIGHT: 247 LBS | HEIGHT: 73 IN

## 2024-07-24 DIAGNOSIS — G89.29 CHRONIC BILATERAL LOW BACK PAIN, UNSPECIFIED WHETHER SCIATICA PRESENT: ICD-10-CM

## 2024-07-24 DIAGNOSIS — M79.2 PERIPHERAL NEUROPATHIC PAIN: ICD-10-CM

## 2024-07-24 DIAGNOSIS — T85.192A FAILURE OF SPINAL CORD STIMULATOR, INITIAL ENCOUNTER: Primary | ICD-10-CM

## 2024-07-24 DIAGNOSIS — M54.50 CHRONIC BILATERAL LOW BACK PAIN, UNSPECIFIED WHETHER SCIATICA PRESENT: ICD-10-CM

## 2024-07-24 RX ORDER — MELOXICAM 15 MG/1
15 TABLET ORAL DAILY
COMMUNITY
Start: 2024-07-23 | End: 2024-08-06

## 2024-07-24 RX ORDER — BACLOFEN 10 MG/1
10 TABLET ORAL 2 TIMES DAILY PRN
Qty: 60 TABLET | Refills: 1 | Status: SHIPPED | OUTPATIENT
Start: 2024-07-24

## 2024-07-24 NOTE — PROGRESS NOTES
Referring Physician: No referring provider defined for this encounter.    Primary Physician: Pati Welch MD    CHIEF COMPLAINT or REASON FOR VISIT: Back Pain and Follow-up      Initial history of present illness on 06/26/2024:  Mr. Osbaldo Holt is 53 y.o. male who presents as a new patient referral for evaluation and treatment of bilateral foot numbness tingling pain.  Mr. Wells first developed bilateral forefoot and toes paresthesias and burning sensation in approximately 2020/2021 without any inciting or trauma.  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  He underwent evaluation at the time with Dr. Lane, pain management in [place].  He did apparently undergo EMG/NCV in [facility] as well.  He subsequently underwent dorsal root ganglion trial of bilateral L5, S1 nerve roots with Dr. Lane which he states provided good relief of his symptoms.  He is not diabetic.  He has taken pregabalin, Cymbalta with modest benefit.  He subsequently underwent bilateral L5, S1 dorsal root ganglion stimulator permanent implant in 2022 with Dr. Lane.  He reports some good relief after this implant for short few months until he lost efficacy.  Subsequent imaging demonstrated migration of bilateral S1 leads.  Patient then recently followed up with Dr. Roger Shane, pain management, where [employer] device representatives did turn back on his bilateral L5 leads which has produced some improvement in his symptoms however the patient would like better relief.  Dr. Shane referred to me for consideration of DRG revision.    Interval history:  Patient returns to clinic today.  He continues to complain of bilateral foot numbness tingling pain.  The symptoms are unchanged since his previous office visit.  He does report some chronic low back pain since the latest adjustment of his DRG.  He is scheduled for SCS trial with Srinivas on 9/12/2024.  He has a follow-up appointment on 9/16/2024  for lead pull.  He was just prescribed meloxicam.  He has been taking cyclobenzaprine for years without benefit.    Interventions:      Objective Pain Scoring:   BRIEF PAIN INVENTORY:  Total score:   Pain Score    07/24/24 0750   PainSc:   9   PainLoc: Back        PHQ-2: PHQ-2 Total Score: 3  PHQ-9: PHQ-9: Brief Depression Severity Measure Score: 18  Opioid Risk Tool:         Review of Systems:   ROS negative except as otherwise noted     Past Medical History:   Past Medical History:   Diagnosis Date    Chronic pain disorder 2021    Back legs feet    COPD (chronic obstructive pulmonary disease)     Extremity pain     Fractures 2016    GERD (gastroesophageal reflux disease)     Joint pain     Low back pain     MRSA infection     10 years ago after spider bite     Neck pain     Neuropathy in diabetes     Wears dentures          Past Surgical History:   Past Surgical History:   Procedure Laterality Date    BACK SURGERY  DRG implant    CHOLECYSTECTOMY N/A 04/18/2019    Procedure: CHOLECYSTECTOMY LAPAROSCOPIC;  Surgeon: Sonido Shelton MD;  Location: [facility];  Service: General    COLONOSCOPY      ELBOW PROCEDURE Right     HAND SURGERY Left     from spider bite    WRIST SURGERY Right          Family History   Family History   Problem Relation Age of Onset    Cancer Mother     COPD Father     Arthritis Maternal Grandfather     Cancer Maternal Grandfather     Cancer Maternal Grandmother     Cancer Paternal Grandfather          Social History   Social History     Socioeconomic History    Marital status:    Tobacco Use    Smoking status: Every Day     Current packs/day: 0.50     Average packs/day: 0.6 packs/day for 49.3 years (30.0 ttl pk-yrs)     Types: Cigarettes     Start date: 1/25/1986    Smokeless tobacco: Never    Tobacco comments:     Still smoking   Vaping Use    Vaping status: Some Days    Substances: Nicotine    Devices: Disposable   Substance and Sexual Activity    Alcohol use: Yes      "Alcohol/week: 11.0 standard drinks of alcohol     Types: 9 Cans of beer, 2 Drinks containing 0.5 oz of alcohol per week    Drug use: No    Sexual activity: Not Currently     Partners: Female     Birth control/protection: Same-sex partner        Medications:     Current Outpatient Medications:     albuterol (PROVENTIL) (2.5 MG/3ML) 0.083% nebulizer solution, INHALE 1 UNIT BY NEBULIZER EVERY 8 HOURS AS NEEDED, Disp: , Rfl:     albuterol sulfate  (90 Base) MCG/ACT inhaler, INHALE 2 PUFFS INTO LUNGS EVERY 4 HOURS AS NEEDED SHORTNESS OF BREATH, Disp: , Rfl:     atorvastatin (LIPITOR) 20 MG tablet, Take  by mouth., Disp: , Rfl:     celecoxib (CeleBREX) 200 MG capsule, Take 1 capsule by mouth., Disp: , Rfl:     clindamycin (CLEOCIN) 300 MG capsule, TAKE 1 CAPSULE ORAL ROUTE 4 TIMES PER DAY FOR 7 DAYS, Disp: , Rfl:     DULoxetine (CYMBALTA) 60 MG capsule, Take 1 capsule by mouth Daily., Disp: , Rfl:     glucose blood test strip, 4x/day, Please provide strips compatible with patient's meter and insurance, Disp: , Rfl:     HYDROcodone-acetaminophen (NORCO) 5-325 MG per tablet, Take 1-2 tablets by mouth Every 6 (Six) Hours As Needed (Pain)., Disp: 20 tablet, Rfl: 0    Jardiance 25 MG tablet tablet, Take 1 tablet by mouth Daily., Disp: , Rfl:     meloxicam (MOBIC) 15 MG tablet, Take 1 tablet by mouth Daily., Disp: , Rfl:     metFORMIN ER (GLUCOPHAGE-XR) 500 MG 24 hr tablet, TAKE TWO TABLETS BY MOUTH TWICE DAILY WITH MEALS DO NOT CRUSH CHEW OR EXPECTORATE, Disp: , Rfl:     omeprazole (priLOSEC) 20 MG capsule, Take 1 capsule by mouth Daily., Disp: , Rfl:     pregabalin (LYRICA) 300 MG capsule, Take 1 capsule by mouth 2 (Two) Times a Day., Disp: , Rfl:     Trelegy Ellipta 100-62.5-25 MCG/ACT inhaler, INHALE 1 PUFF INTO LUNGS EVERY DAY FOR COPD, Disp: , Rfl:         Physical Exam:     Vitals:    07/24/24 0750   Weight: 112 kg (247 lb)   Height: 185.4 cm (73\")   PainSc:   9   PainLoc: Back          General: Alert and " oriented, No acute distress.   HEENT: Normocephalic, atraumatic.   Cardiovascular: No gross edema  Respiratory: Respirations are non-labored  There is a right flank scar and IPG    Sensory Exam: Patient does have full sensation in bilateral forefeet however complains of paresthesias/dysesthesia in all toes and bilateral forefeet worse on the plantar surface.      Neurologic: Cranial Nerves II-XII are grossly intact.     Psychiatric: Cooperative.   Gait: Normal   Assistive Devices: None    Imaging Studies:   No results found for this or any previous visit.        Independent review of radiographic imaging:     Impression & Plan:       06/26/2024: Osbaldo Holt is a 53 y.o. male with past medical history significant for COPD, GERD, bilateral L5, S1 DRG implant complicated by lead migration, peripheral neuropathic pain who presents to the pain clinic for evaluation and treatment of spinal stimulator implant malfunction.  I did personally review images which demonstrated complete migration of the left S1 stimulator lead into the subcutaneous tissue and advancement of the right S1 stimulator lead into the pelvis.  I presented the patient with the following options:   Reprogramming of current device L5 leads and leaving it as is (of note current lead position outside of the epidural space is NOT MRI compatible)  Revision of the DRG system which would necessitate removing both S1 leads, replacing both S1 leads, accessing IPG site  Dorsal column spinal stimulator trial with HF 10 therapy.  If trial is successful would simultaneously remove DRG system and replace with dorsal column system.    Patient elected to proceed with option #3.  I will therefore obtain psychiatric clearance for dorsal column stimulator trial.  1 week prior to HF 10 trial I will have the patient turn off his dorsal root ganglion stimulator device to reestablish baseline.  I had a discussion with the patient regarding the risks of the procedure  including bleeding, infection, damage to surrounding structures.  7/24/2024: Scheduled for SCS trial with Nevro on 9/12/2024.  Will start baclofen.  Discontinue cyclobenzaprine    1. Failure of spinal cord stimulator, initial encounter    2. Peripheral neuropathic pain    3. Chronic bilateral low back pain, unspecified whether sciatica present          PLAN:  1. Medication Recommendations: Recommend Voltaren topical, NSAIDs, Tylenol.  Can trial turmeric 500 mg twice daily if NSAID contraindicated.  -Discontinue cyclobenzaprine  -Start baclofen 10 mg 2 times daily as needed    2. Physical Therapy: Continue HEP    3. Psychological: Psych clearance obtained    4. Complementary and alternative (CAM) Therapies:     5. Labs/Diagnostic studies: None indicated     6. Imaging: Radiographs reviewed    7. Interventions: Already scheduled for spinal cord stimulator trial with Nevro on 9/12/2024.  Advised patient to turn DRG off 1 week prior to procedure    8. Referrals:     9. Records:     10. Lifestyle goals:    Follow-up as scheduled on 9/16/2024 for lead pull      [facility]  Brent Arceo PA-C          Quality Metrics:

## 2024-09-06 ENCOUNTER — TRANSCRIBE ORDERS (OUTPATIENT)
Dept: ADMINISTRATIVE | Facility: HOSPITAL | Age: 54
End: 2024-09-06
Payer: COMMERCIAL

## 2024-09-06 DIAGNOSIS — M25.511 ACUTE PAIN OF RIGHT SHOULDER: Primary | ICD-10-CM

## 2024-09-09 RX ORDER — BACLOFEN 10 MG/1
10 TABLET ORAL 2 TIMES DAILY PRN
Qty: 45 TABLET | Refills: 0 | Status: SHIPPED | OUTPATIENT
Start: 2024-09-09

## 2024-09-12 ENCOUNTER — OUTSIDE FACILITY SERVICE (OUTPATIENT)
Dept: PAIN MEDICINE | Facility: CLINIC | Age: 54
End: 2024-09-12
Payer: COMMERCIAL

## 2024-09-12 ENCOUNTER — DOCUMENTATION (OUTPATIENT)
Dept: PAIN MEDICINE | Facility: CLINIC | Age: 54
End: 2024-09-12

## 2024-09-12 NOTE — PROGRESS NOTES
[facility]  [address] [place] [address]    PROCEDURE: Spinal Cord Stimulator Trial, Two Lead(s)  Device: NEVRO    PRE-OP DIAGNOSIS: Chronic Pain, painful diabetic neuropathy  POST-OP DIAGNOSIS:Same    CONSENT: Risks, benefits and options were explained to the patient, all questions were answered and written informed consent was obtained.     BLOOD THINNERS (ANTIPLATELETS/ANTICOAGULANTS): Were discussed with the patient and ELAINE Guidelines were followed.    ANTIBIOTICS: Ancef 2 grams IV    ANESTHESIA: Moderate sedation was required to maintain comfort, safety, and cooperation during the procedure.  The duration of sedation service was over 10 minutes.  Patient received 2mg IV Versed and 100mcg IV fentanyl.  Independent observation and monitoring was performed by Joseline Hernandez.  The patient's level of consciousness and physiologic status was continually monitored with pulse oximetry, EKG from 1019 to 1047.  There were no complications or adverse events during sedation.  After the sedation patient was taken to the recovery area.      PROCEDURE NOTE: The patient was placed prone with the abdomen supported by a pillow and all pressure points were padded. Standard ASA monitors were applied. A timeout protocol was performed. Intravenous Antibiotics were administered prior to needle placement. Proper protective gear was worn by the physician including a mask, scrub cap, sterile gown, and sterile gloves. The patient was prepped and draped in the usual sterile fashion using Chlorhexidine, followed by sterile towels and laparotomy full body drape. Fluoroscopy was then used to identify entry into the epidural space and needle entry site at the skin. Next, a 50-50 mixture containing 0.5% bupivacaine and 2% lidocaine plain was used to raise a skin wheal and anesthetize deeper tissues. Next, a 22 gauge 3.5 inch spinal needle was used to anesthetize the tract down to the target  lamina with the same mixture. Using intermittent fluoroscopic guidance, a 14 gauge Tuohy needle was then advanced to contact the inferior lamina of the target entry interlaminar space. It was then walked off in a superior medial direction and a loss of resistance technique with stimulator lead was used to facilitate entrance into the epidural space of the T12/L1 interlaminar space. A stimulator lead was advanced through the Tuohy needle into the epidural space and directed to rest the tip at the top of the T8 vertebral body.  A second stimulator lead was inserted on the contralateral side using the identical technique and advanced to the top of the T9 vertebral body.  AP and lateral views were used to confirm appropriate posterior and midline position of the leads. [Once leads were determined to be in the correct position, the needles were withdrawn from the epidural space leaving the leads carefully in place. Leads were then secured with Steri-Strips and Tegaderm. The patient as then transferred to a stretcher and taken to recovery in stable condition.    EBL: Minimal    COMPLICATIONS: None    The patient tolerated the procedure well. Vital signs were stable. Sensory and motor exam was unchanged from baseline. The patient was observed in recovery for appropriate time and discharged in stable condition.    FOLLOW UP: as scheduled for post operative visit in clinic    ADDITIONAL NOTES: None    Final programming of the device was done in the recovery room by the device  representative. The patient (or responsible party) was given post-procedural and  discharge instructions to follow at home with an emphasis on instructions to avoid  infection and lead migration.    CODES:  63650 x2  [id number]  [id number]

## 2024-09-16 ENCOUNTER — OFFICE VISIT (OUTPATIENT)
Dept: PAIN MEDICINE | Facility: CLINIC | Age: 54
End: 2024-09-16
Payer: COMMERCIAL

## 2024-09-16 VITALS — BODY MASS INDEX: 32.2 KG/M2 | HEIGHT: 73 IN | WEIGHT: 243 LBS

## 2024-09-16 DIAGNOSIS — G89.29 CHRONIC BILATERAL LOW BACK PAIN, UNSPECIFIED WHETHER SCIATICA PRESENT: ICD-10-CM

## 2024-09-16 DIAGNOSIS — T85.192A FAILURE OF SPINAL CORD STIMULATOR, INITIAL ENCOUNTER: ICD-10-CM

## 2024-09-16 DIAGNOSIS — M79.2 PERIPHERAL NEUROPATHIC PAIN: Primary | ICD-10-CM

## 2024-09-16 DIAGNOSIS — M54.50 CHRONIC BILATERAL LOW BACK PAIN, UNSPECIFIED WHETHER SCIATICA PRESENT: ICD-10-CM

## 2024-09-16 PROCEDURE — 99213 OFFICE O/P EST LOW 20 MIN: CPT

## 2024-09-16 PROCEDURE — 95970 ALYS NPGT W/O PRGRMG: CPT

## 2024-09-18 ENCOUNTER — HOSPITAL ENCOUNTER (OUTPATIENT)
Facility: HOSPITAL | Age: 54
Discharge: HOME | End: 2024-09-18
Payer: COMMERCIAL

## 2024-09-18 DIAGNOSIS — M25.511 ACUTE PAIN OF RIGHT SHOULDER: ICD-10-CM

## 2024-09-24 DIAGNOSIS — T85.192A FAILURE OF SPINAL CORD STIMULATOR, INITIAL ENCOUNTER: Primary | ICD-10-CM

## 2024-09-24 DIAGNOSIS — M79.2 PERIPHERAL NEUROPATHIC PAIN: ICD-10-CM

## 2024-09-24 DIAGNOSIS — T85.192A FAILURE OF SPINAL CORD STIMULATOR, INITIAL ENCOUNTER: ICD-10-CM

## 2024-09-25 ENCOUNTER — TELEPHONE (OUTPATIENT)
Dept: PAIN MEDICINE | Facility: CLINIC | Age: 54
End: 2024-09-25
Payer: COMMERCIAL

## 2024-09-25 ENCOUNTER — TELEPHONE (OUTPATIENT)
Dept: PAIN MEDICINE | Facility: CLINIC | Age: 54
End: 2024-09-25

## 2024-09-25 NOTE — TELEPHONE ENCOUNTER
Provider:     Caller: GEM    Relationship to Patient: SELF    Pharmacy:     Phone Number: [phone]    Reason for Call: PT CALLING TO DISCUSS HIS UPCOMING PROCEDURE WITH SOMEONE

## 2024-09-26 NOTE — TELEPHONE ENCOUNTER
Surgery/Procedure:  bilateral L5/S1 dorsal root ganglion stimulator explant   Procedure (2) permanent spinal cord stimulator implant with Nevro   Surgery/Procedure Date:  10/01/2024 and 10/08/24  Last visit:   Office Visit with Brent Arceo PA-C (09/16/2024)   Next visit: 11/13/2024     Reason for call:    Patient called the office stating he spoke to his insurance company yesterday and was told that he could get his procedure completed between now and May.     Patient states he need to complete both procedures on 10/01/24 or he is not completing the procedures.

## 2024-10-01 ENCOUNTER — OUTSIDE FACILITY SERVICE (OUTPATIENT)
Dept: PAIN MEDICINE | Facility: CLINIC | Age: 54
End: 2024-10-01
Payer: COMMERCIAL

## 2024-10-01 ENCOUNTER — TELEPHONE (OUTPATIENT)
Dept: PAIN MEDICINE | Facility: CLINIC | Age: 54
End: 2024-10-01

## 2024-10-01 ENCOUNTER — DOCUMENTATION (OUTPATIENT)
Dept: PAIN MEDICINE | Facility: CLINIC | Age: 54
End: 2024-10-01

## 2024-10-01 DIAGNOSIS — M79.2 PERIPHERAL NEUROPATHIC PAIN: Primary | ICD-10-CM

## 2024-10-01 PROCEDURE — 63661 REMOVE SPINE ELTRD PERQ ARAY: CPT | Performed by: STUDENT IN AN ORGANIZED HEALTH CARE EDUCATION/TRAINING PROGRAM

## 2024-10-01 PROCEDURE — 63688 REV/RMV IMP SP NPG/R DTCH CN: CPT | Performed by: STUDENT IN AN ORGANIZED HEALTH CARE EDUCATION/TRAINING PROGRAM

## 2024-10-01 RX ORDER — OXYCODONE AND ACETAMINOPHEN 7.5; 325 MG/1; MG/1
1 TABLET ORAL EVERY 6 HOURS PRN
Qty: 12 TABLET | Refills: 0 | Status: SHIPPED | OUTPATIENT
Start: 2024-10-01

## 2024-10-01 NOTE — PROGRESS NOTES
[facility]  [address] [place] [address]    PROCEDURE: Bilateral L5, bilateral S1 dorsal Root Ganglion Stimulator explant     Device: ABBOTT DRG       PRE-OP DIAGNOSIS: Failure of dorsal root ganglion stimulator  POST-OP DIAGNOSIS: Same      BLOOD THINNERS (ANTIPLATELETS/ANTICOAGULANTS): Were discussed with the patient and ELAINE Guidelines were followed.     CONSENT: Risks, benefits and options were explained to the patient, all questions were answered and written informed consent was obtained.      ANESTHESIA: GETA     ANTIBIOTICS: Ancef 2 gm IV     PROCEDURE NOTE: The patient was placed prone with the abdomen supported by a pillow and all pressure points were padded. Standard ASA monitors were applied. A timeout protocol was performed. Intravenous Antibiotics were administered prior to making incision. Proper protective gear was worn by the physician including a mask, scrub cap, sterile gown, and sterile gloves. The patient was prepped and draped in the usual sterile fashion using DuraPrep followed by sterile towels, Ioban and laparotomy full body drape. Next, a 50/50 mixture containing 1% lidocaine with epinephrine was used to raise a skin wheal and anesthetize deeper tissues along the IPG site incision at the flank. Then a 15 blade scalpel was used to create an incision along the previous incision.     A combination of blunt and sharp dissection was used to reopen the small pocket containing the battery. Hemostasis was achieved throughout with a combination of pressure and electrocautery.  Pre-existing IPG was then removed.  Then attention was turned to the leads.  Under intermittent AP fluoroscopy both S1 leads were removed without incident with all lead tips intact.    Then, attention was turned to the bilateral L5 leads.  The leads were identified and, under gentle traction, the insertion site was identified.  The overlying skin was anesthetized at  approximately upper buttock overlying the iliac crest bilaterally.  Deep tissues were additionally anesthetized with the same mixture as above.  Then, with a 15 blade scalpel approximately 1 cm incisions were made overlying the original DRG insertion sites.  Using blunt dissection the leads were revealed at the original insertion site and pulled through from the IPG site.  Again under intermittent AP fluoroscopy leads were pulled under gentle traction.  At this time it was apparent that leads were adhered to the periosteum of the iliac crest bilaterally.  A small elevator was used to attempt to dissect the lead off of the periosteum of the PSIS/iliac crest.  Again gentle traction was used to try to remove the L5 dorsal root ganglion leads.  Both leads fractured at the site of the iliac crest where they were not adhered to the periosteum.  All contacts remain within the epidural space as revealed by AP and lateral fluoroscopy.  A lead remnant remains in both the left and the right L5/S1 foramen.  At this point the decision was made to leave these remnants.     Then, the IPG pocket was thoroughly irrigated with 0.9% normal saline.  Fluoroscopy was used to confirm removal of all device hardware except the 2 remaining lead segments.       Then, wound edges were approximated and deep layers were closed with 2-0 Vicryl suture in simple interrupted fashion and then subcuticular tissue was closed with surgical staples.  The same technique was again utilized at the midline anchor site as well. Occlusive sterile dressings were then applied to both of the surgical sites.  Abdominal binder was placed over the patient with an abdominal pad at the former IPG site for additional compression.  The patient was transported to the recovery room with standard ASA monitors in stable condition.     EBL: Minimal     COMPLICATIONS: The patient tolerated the procedure well. Vital signs were stable. Sensory and motor exam was unchanged from  baseline. The patient was observed in recovery for appropriate time and discharged in stable condition.    2-lead fragments remain within the epidural space of the L5/S1 foramen bilaterally.     FOLLOW UP: as scheduled for post operative visit in clinic     ADDITIONAL NOTES: [n/a]     CODES:  63661 x 4  [id number]  [id number]

## 2024-10-01 NOTE — TELEPHONE ENCOUNTER
Hub staff attempted to follow warm transfer process and was unsuccessful     Caller: Osbaldo Holt    Relationship to patient: Self    Best call back number:[phone]    Patient is needing: PATIENT WAS SUPPOSED TO GET A PRESCRIPTION CALLED IN AFTER THE PROCEDURE

## 2024-10-08 ENCOUNTER — OFFICE VISIT (OUTPATIENT)
Dept: PAIN MEDICINE | Facility: CLINIC | Age: 54
End: 2024-10-08
Payer: COMMERCIAL

## 2024-10-08 VITALS — WEIGHT: 246 LBS | HEIGHT: 73 IN | BODY MASS INDEX: 32.6 KG/M2

## 2024-10-08 DIAGNOSIS — M79.2 PERIPHERAL NEUROPATHIC PAIN: Primary | ICD-10-CM

## 2024-10-08 DIAGNOSIS — M54.50 CHRONIC BILATERAL LOW BACK PAIN, UNSPECIFIED WHETHER SCIATICA PRESENT: ICD-10-CM

## 2024-10-08 DIAGNOSIS — G89.29 CHRONIC BILATERAL LOW BACK PAIN, UNSPECIFIED WHETHER SCIATICA PRESENT: ICD-10-CM

## 2024-10-08 DIAGNOSIS — T85.192S FAILED SPINAL CORD STIMULATOR, SEQUELA: ICD-10-CM

## 2024-10-08 PROCEDURE — 99024 POSTOP FOLLOW-UP VISIT: CPT

## 2024-10-08 RX ORDER — TIRZEPATIDE 5 MG/.5ML
INJECTION, SOLUTION SUBCUTANEOUS
COMMUNITY
Start: 2024-09-27

## 2024-10-08 NOTE — PROGRESS NOTES
Referring Physician: No referring provider defined for this encounter.    Primary Physician: Graham Sanchez MBBS    CHIEF COMPLAINT or REASON FOR VISIT: Follow-up and Back Pain (Post bilateral L5, bilateral S1 dorsal Root Ganglion Stimulator explant)      Initial history of present illness on 06/26/2024:  Mr. Osbaldo Holt is 53 y.o. male who presents as a new patient referral for evaluation and treatment of bilateral foot numbness tingling pain.  Mr. Wells first developed bilateral forefoot and toes paresthesias and burning sensation in approximately 2020/2021 without any inciting or trauma.  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  He underwent evaluation at the time with Dr. Lane, pain management in [place].  He did apparently undergo EMG/NCV in [facility] as well.  He subsequently underwent dorsal root ganglion trial of bilateral L5, S1 nerve roots with Dr. Lane which he states provided good relief of his symptoms.  He is not diabetic.  He has taken pregabalin, Cymbalta with modest benefit.  He subsequently underwent bilateral L5, S1 dorsal root ganglion stimulator permanent implant in 2022 with Dr. Lane.  He reports some good relief after this implant for short few months until he lost efficacy.  Subsequent imaging demonstrated migration of bilateral S1 leads.  Patient then recently followed up with Dr. Roger Shane, pain management, where [employer] device representatives did turn back on his bilateral L5 leads which has produced some improvement in his symptoms however the patient would like better relief.  Dr. Shane referred to me for consideration of DRG revision.    Interval history:  Patient returns to clinic today after undergoing a complete DRG explantation.  Unfortunately, during this procedure the bilateral L5 leads were unable to be removed as they appeared to be adhered to the periosteum of the iliac crest.  Upon the attempt to remove  this leads they did break however the contacts still appear to remain in the epidural space.  IPG and S1 leads were removed without incident.  Patient is set to undergo permanent SCS implant with Nevro on 11/5/2024.   Today, he continues to complain of bilateral foot numbness tingling pain as well as chronic low back pain.  He denies any new injuries or events since his previous appointment.  Unfortunately, he feels as if his neuropathic pain has been exacerbated since removal of his DRG system.  He reports he was receiving some relief from the DRG but now that is removed he is not receiving any relief.  He is taking pregabalin 300 mg twice daily as well as Cymbalta 60 mg.  We were unable to perform permanent SCS implant with Nevro at the same time as DRG explantation due to increased infection risk as patient is a diabetic with his last A1c being 9.8.  He does have some questions regarding pain medication prescription for 1 month to make it to his procedure.  He is unable to wear socks and shoes at the moment due to increased neuropathic pain.  He denies any new injuries or events since his previous appointment.  He denies any systemic signs of infection.      Interventions:  9/12/2024: SCS trial Nevro with 60-70% relief  10/01/2024: DRG Explantation w/o removal of L5 leads.     Objective Pain Scoring:   BRIEF PAIN INVENTORY:  Total score:   Pain Score    10/08/24 0828   PainSc: 10-Worst pain ever   PainLoc: Back          PHQ-2: PHQ-2 Total Score: 4  PHQ-9: PHQ-9: Brief Depression Severity Measure Score: 11  Opioid Risk Tool:         Review of Systems:   ROS negative except as otherwise noted     Past Medical History:   Past Medical History:   Diagnosis Date    Chronic pain disorder 2021    Back legs feet    COPD (chronic obstructive pulmonary disease)     Extremity pain     Fractures 2016    GERD (gastroesophageal reflux disease)     Joint pain     Low back pain     MRSA infection     10 years ago after spider  bite     Neck pain     Neuropathy in diabetes     Wears dentures          Past Surgical History:   Past Surgical History:   Procedure Laterality Date    BACK SURGERY  DRG implant    CHOLECYSTECTOMY N/A 04/18/2019    Procedure: CHOLECYSTECTOMY LAPAROSCOPIC;  Surgeon: Sonido Shelton MD;  Location: [facility];  Service: General    COLONOSCOPY      ELBOW PROCEDURE Right     HAND SURGERY Left     from spider bite    SPINAL CORD STIMULATOR REMOVAL Bilateral 10/01/2024    bilateral L5, bilateral S1 dorsal Root Ganglion Stimulator explant;Dr. Holbrook.    WRIST SURGERY Right          Family History   Family History   Problem Relation Age of Onset    Cancer Mother     COPD Father     Arthritis Maternal Grandfather     Cancer Maternal Grandfather     Cancer Maternal Grandmother     Cancer Paternal Grandfather          Social History   Social History     Socioeconomic History    Marital status:    Tobacco Use    Smoking status: Every Day     Current packs/day: 0.50     Average packs/day: 0.6 packs/day for 49.5 years (30.2 ttl pk-yrs)     Types: Cigarettes     Start date: 1/25/1986    Smokeless tobacco: Never    Tobacco comments:     Still smoking   Vaping Use    Vaping status: Some Days    Substances: Nicotine    Devices: Disposable   Substance and Sexual Activity    Alcohol use: Yes     Alcohol/week: 11.0 standard drinks of alcohol     Types: 9 Cans of beer, 2 Drinks containing 0.5 oz of alcohol per week    Drug use: No    Sexual activity: Not Currently     Partners: Female     Birth control/protection: Partner of same sex        Medications:     Current Outpatient Medications:     albuterol (PROVENTIL) (2.5 MG/3ML) 0.083% nebulizer solution, INHALE 1 UNIT BY NEBULIZER EVERY 8 HOURS AS NEEDED, Disp: , Rfl:     albuterol sulfate  (90 Base) MCG/ACT inhaler, INHALE 2 PUFFS INTO LUNGS EVERY 4 HOURS AS NEEDED SHORTNESS OF BREATH, Disp: , Rfl:     atorvastatin (LIPITOR) 20 MG tablet, Take  by mouth., Disp: ,  "Rfl:     baclofen (LIORESAL) 10 MG tablet, TAKE ONE TABLET BY MOUTH TWICE DAILY AS NEEDED FOR muscle SPASMS, Disp: 45 tablet, Rfl: 0    celecoxib (CeleBREX) 200 MG capsule, Take 1 capsule by mouth., Disp: , Rfl:     DULoxetine (CYMBALTA) 60 MG capsule, Take 1 capsule by mouth Daily., Disp: , Rfl:     glucose blood test strip, 4x/day, Please provide strips compatible with patient's meter and insurance, Disp: , Rfl:     Jardiance 25 MG tablet tablet, Take 1 tablet by mouth Daily., Disp: , Rfl:     metFORMIN ER (GLUCOPHAGE-XR) 500 MG 24 hr tablet, TAKE TWO TABLETS BY MOUTH TWICE DAILY WITH MEALS DO NOT CRUSH CHEW OR EXPECTORATE, Disp: , Rfl:     Mounjaro 5 MG/0.5ML solution pen-injector pen, , Disp: , Rfl:     mupirocin (BACTROBAN) 2 % nasal ointment, Administer 1 Application into the nostril(s) as directed by provider 3 (Three) Times a Day. Start using 5 days prior to surgery., Disp: 15 g, Rfl: 0    omeprazole (priLOSEC) 20 MG capsule, Take 1 capsule by mouth Daily., Disp: , Rfl:     oxyCODONE-acetaminophen (PERCOCET) 7.5-325 MG per tablet, Take 1 tablet by mouth Every 6 (Six) Hours As Needed for Moderate Pain or Severe Pain., Disp: 12 tablet, Rfl: 0    pregabalin (LYRICA) 300 MG capsule, Take 1 capsule by mouth 2 (Two) Times a Day., Disp: , Rfl:     Trelegy Ellipta 100-62.5-25 MCG/ACT inhaler, INHALE 1 PUFF INTO LUNGS EVERY DAY FOR COPD, Disp: , Rfl:         Physical Exam:     Vitals:    10/08/24 0828   Weight: 112 kg (246 lb)   Height: 185.4 cm (72.99\")   PainSc: 10-Worst pain ever   PainLoc: Back            General: Alert and oriented, No acute distress.   HEENT: Normocephalic, atraumatic.   Cardiovascular: No gross edema  Respiratory: Respirations are non-labored      Sensory Exam: Patient does have full sensation in bilateral forefeet however complains of paresthesias/dysesthesia in all toes and bilateral forefeet worse on the plantar surface.      Neurologic: Cranial Nerves II-XII are grossly intact. "     Psychiatric: Cooperative.   Gait: Normal   Assistive Devices: None    Staples were removed from IPG incision site as well as DRG insertion sites.  Wounds continue to heal appropriately without surrounding erythema, inflammation, or purulent drainage.  Patient was instructed he can let warm soapy water run over these incision sites but to avoid vigorous scrubbing.      Imaging Studies:   No results found for this or any previous visit.        Independent review of radiographic imaging:     Impression & Plan:       06/26/2024: Osbaldo Holt is a 53 y.o. male with past medical history significant for COPD, GERD, bilateral L5, S1 DRG implant complicated by lead migration, peripheral neuropathic pain who presents to the pain clinic for evaluation and treatment of spinal stimulator implant malfunction.  I did personally review images which demonstrated complete migration of the left S1 stimulator lead into the subcutaneous tissue and advancement of the right S1 stimulator lead into the pelvis.  I presented the patient with the following options:   Reprogramming of current device L5 leads and leaving it as is (of note current lead position outside of the epidural space is NOT MRI compatible)  Revision of the DRG system which would necessitate removing both S1 leads, replacing both S1 leads, accessing IPG site  Dorsal column spinal stimulator trial with HF 10 therapy.  If trial is successful would simultaneously remove DRG system and replace with dorsal column system.    Patient elected to proceed with option #3.  I will therefore obtain psychiatric clearance for dorsal column stimulator trial.  1 week prior to HF 10 trial I will have the patient turn off his dorsal root ganglion stimulator device to reestablish baseline.  I had a discussion with the patient regarding the risks of the procedure including bleeding, infection, damage to surrounding structures.  7/24/2024: Scheduled for SCS trial with Srinivas on 9/12/2024.   Will start baclofen.  Discontinue cyclobenzaprine  2024: Great relief from SCS trial with Nevro for painful bilateral peripheral neuropathy.  Will proceed with permanent implant.  Additionally, we will schedule entire DRG stimulator system explant.  Risk of procedure were discussed in great detail at today's appointment.  Risks include bleeding, infection, damage to surrounding structures that could exacerbate symptoms, paralysis, death, lead migration, pocket site pain, failure of device, broken DRG lead which could impact MRI compatibility.  Patient voiced understanding.  Appropriate wound care instructions given  10/08/2024: Wounds continue to heal appropriately after DRG explantation.  Will plan for permanent SCS implant with Nevro on 2024.  Risk of this procedure include bleeding, infection, demonstrating structures to exacerbate symptoms, lead migration, malfunction of device, paralysis and death.  He voiced understanding    1. Peripheral neuropathic pain    2. Failed spinal cord stimulator, sequela    3. Chronic bilateral low back pain, unspecified whether sciatica present              PLAN:  1. Medication Recommendations: Recommend Voltaren topical, NSAIDs, Tylenol.  Can trial turmeric 500 mg twice daily if NSAID contraindicated.  Do not recommend opioids for use of withdrawal, dependence, immunosuppression, hormonal imbalances.  Additionally, this can make his recovery more difficult perioperatively  -Continue baclofen  -Patient reports he has tried taking tramadol in the past without relief.   -Start topical antineuropathic cream    2. Physical Therapy: Continue HEP    3. Psychological: Psych clearance obtained    4. Complementary and alternative (CAM) Therapies:     5. Labs/Diagnostic studies: None indicated   -Patient's last HbA1c was 9.8.  He is at an increased risk of infection and postoperative complications secondary to this.    6. Imagin. Interventions: Will proceed with permanent  SCS implant with Nevro (63650 x2, 04243, [id number])  -s/p DRG explantation including S1 leads and IPG device.  L5 leads  were unable to removed. The leads did break upon explantation, however, the contacts remain within the epidural space.     8. Referrals:     9. Records:     10. Lifestyle goals:    Follow-up as scheduled on 11/13/2024  after permanent SCS implant; sooner if clinically dictated      [facility]  Brent Arceo PA-C          Quality Metrics:

## 2024-10-15 ENCOUNTER — TELEPHONE (OUTPATIENT)
Dept: PAIN MEDICINE | Facility: CLINIC | Age: 54
End: 2024-10-15
Payer: COMMERCIAL

## 2024-10-15 ENCOUNTER — HOSPITAL ENCOUNTER (OUTPATIENT)
Dept: GENERAL RADIOLOGY | Facility: HOSPITAL | Age: 54
Discharge: HOME | End: 2024-10-15
Admitting: NURSE PRACTITIONER
Payer: COMMERCIAL

## 2024-10-15 ENCOUNTER — HOSPITAL ENCOUNTER (OUTPATIENT)
Dept: MRI IMAGING | Facility: HOSPITAL | Age: 54
End: 2024-10-15
Payer: COMMERCIAL

## 2024-10-15 DIAGNOSIS — M25.511 ACUTE PAIN OF RIGHT SHOULDER: ICD-10-CM

## 2024-10-15 PROCEDURE — 74018 RADEX ABDOMEN 1 VIEW: CPT

## 2024-10-15 NOTE — TELEPHONE ENCOUNTER
S/w patient per Jeffery's request-patient states that his incision is doing well but his foot is not.

## 2024-10-15 NOTE — TELEPHONE ENCOUNTER
S/w patient and relayed Jeffery's message. Patient verbalized understanding. He also reports that he cannot wait for the procedure to get relief in his feet. Patient requesting pain medication.

## 2024-10-30 ENCOUNTER — TELEPHONE (OUTPATIENT)
Dept: PAIN MEDICINE | Facility: CLINIC | Age: 54
End: 2024-10-30
Payer: COMMERCIAL

## 2024-10-30 NOTE — TELEPHONE ENCOUNTER
"Called patient, r/s follow up after 11/5/2024 procedure with Dr Moreno to 11/19. Reminded him that mupirocin has been sent to his pharmacy, and that he should use it 3 times per day for 5 days prior to his procedure.    In addition, the patient states that \"wires were left in his back\" that he said must be removed during his next procedure.  "

## 2024-11-05 ENCOUNTER — DOCUMENTATION (OUTPATIENT)
Dept: PAIN MEDICINE | Facility: CLINIC | Age: 54
End: 2024-11-05

## 2024-11-05 ENCOUNTER — OUTSIDE FACILITY SERVICE (OUTPATIENT)
Dept: PAIN MEDICINE | Facility: CLINIC | Age: 54
End: 2024-11-05
Payer: COMMERCIAL

## 2024-11-05 DIAGNOSIS — M79.2 PERIPHERAL NEUROPATHIC PAIN: ICD-10-CM

## 2024-11-05 PROCEDURE — 63650 IMPLANT NEUROELECTRODES: CPT | Performed by: STUDENT IN AN ORGANIZED HEALTH CARE EDUCATION/TRAINING PROGRAM

## 2024-11-05 PROCEDURE — 63685 INS/RPLC SPI NPG/RCVR POCKET: CPT | Performed by: STUDENT IN AN ORGANIZED HEALTH CARE EDUCATION/TRAINING PROGRAM

## 2024-11-05 RX ORDER — OXYCODONE AND ACETAMINOPHEN 7.5; 325 MG/1; MG/1
1 TABLET ORAL EVERY 6 HOURS PRN
Qty: 12 TABLET | Refills: 0 | Status: SHIPPED | OUTPATIENT
Start: 2024-11-05

## 2024-11-05 NOTE — PROGRESS NOTES
[facility]  [address] [place] [address]    PROCEDURE: Spinal Cord Stimulator Implant, Two Leads    Device: NEVRO    PRE-OP DIAGNOSIS: Painful Diabetic Neuropathy  POST-OP DIAGNOSIS: Same    BLOOD THINNERS (ANTIPLATELETS/ANTICOAGULANTS): Were discussed with the patient and ELAINE Guidelines were followed.    CONSENT: Risks, benefits and options were explained to the patient, all questions were answered and written informed consent was obtained.     ANESTHESIA: MAC. See anesthetic record    ANTIBIOTICS: Ancef 2 gm IV    PROCEDURE NOTE: The patient was placed prone with the abdomen supported by a pillow and all pressure points were padded. Standard ASA monitors were applied. A timeout protocol was performed. Intravenous Antibiotics were administered prior to making incision. Proper protective gear was worn by the physician including a mask, scrub cap, sterile gown, and sterile gloves. The patient was prepped and draped in the usual sterile fashion using Chlorhexidine followed by sterile towels, laparotomy full body drape and Ioban. Fluoroscopy was then used to  identify target entry epidural space and needle entry site at the skin. Next, a 50/50 mixture containing 0.5% bupivacaine and 1% lidocaine with epinephrine was used to raise a skin wheal and anesthetize deeper tissues at the midline. Then a 15 blade scalpel was used to create a longitudinal midline incision down to the superficial fascial plane. Hemostasis was achieved throughout with a combination of pressure and electrocautery. Next, a 22 gauge 3.5 inch spinal needle was used to anesthetize the tract down to the target lamina. Using intermittent fluoroscopic guidance, a 14 gauge Tuohy needle was then advanced to contact the inferior lamina of the target entry interlaminar space. Onder contralateral oblique, AP and Lateral intermittent fluoroscopy the needle was then walked off in a superior medial direction and  a guidewire was used to facilitate entrance into the epidural space with a loss of resistance technique into the target interlaminar space at T12/L1. A stimulator lead was advanced through the Tuohy needle into the epidural space and directed to rest the tip at the top of the T8 vertebral body. The procedure was then repeated with a second 14 gauge Tuohy needle with again loss of resistance to wire at the same interlaminar space. The second stimulator lead was advanced through the Tuohy needle into the epidural space and directed to rest at the top of the T9 vertebral body. AP and lateral views were used to confirm appropriate posterior and midline position of the leads.  The needles were withdrawn from the epidural space leaving the leads carefully in place.     Leads were then anchored to the superficial fascia using device company anchoring device and secured with 0-0 Ethibond suture.     Attention was then directed to the RIGHT flank site above the belt line for the Internal Pulse Generator (IPG). Skin was anesthetized in same fashion and a horizontal incision was made and dissected down approximately 1 cm deep at the site of his prior battery pocket site. A combination of blunt and sharp dissection was used to create a small pocket for the IPG. Hemostasis was achieved throughout with a combination of pressure and electrocautery. Next, a tunneling device was used to bring the stimulator leads to the IPG pocket. The lead tips were cleaned and connected to the IPG. Impedance testing was performed by device company representative to confirm appropriate functioning of leads. The leads were then locked into the IPG. Both pocket and lead anchor incisions were thoroughly irrigated with 0.9% normal saline and the IPG battery was placed in the pocket. Care was taken to curl the leads under the battery during placement in the pocket. Finally, both incisions were closed. Deep layers were closed with 2-0 PDS suture in  simple interrupted fashion and then subcuticular tissue was closed with 3-0 Vicryl suture. Exofin was then used to approximate the incision edges. Occlusive sterile dressings were then applied to both of the surgical sites. An abdominal binder was placed on the patient. The patient was transported to the recovery room with standard ASA monitors in stable condition.    EBL: Less than 50mL    COMPLICATIONS: None. The patient tolerated the procedure well. Vital signs were stable. Sensory and motor exam was unchanged from baseline. The patient was observed in recovery for appropriate time and discharged in stable condition.    FOLLOW UP: as scheduled for post operative visit in clinic    ADDITIONAL NOTES: [n/a]    Final programming of the device was done in the recovery room by the device  representative. The patient (or responsible party) was given post-procedural and  discharge instructions to follow at home with an emphasis on instructions to avoid  infection and lead migration.    CODES:   63650 x 2  [id number]  [id number]

## 2024-11-06 ENCOUNTER — TELEPHONE (OUTPATIENT)
Dept: PAIN MEDICINE | Facility: CLINIC | Age: 54
End: 2024-11-06
Payer: COMMERCIAL

## 2024-11-06 NOTE — TELEPHONE ENCOUNTER
Patient called and said the pain medication he was given yesterday after his procedure are not helping him, and he wants to know if he can get anything stronger.

## 2024-11-07 NOTE — TELEPHONE ENCOUNTER
S/w patient and relayed Dr Holbrook's message regarding supplementing his pain medication with no more than 4000 mg tylenol per day.

## 2024-11-19 ENCOUNTER — OFFICE VISIT (OUTPATIENT)
Dept: PAIN MEDICINE | Facility: CLINIC | Age: 54
End: 2024-11-19
Payer: COMMERCIAL

## 2024-11-19 VITALS — BODY MASS INDEX: 32.47 KG/M2 | HEIGHT: 73 IN | WEIGHT: 245 LBS

## 2024-11-19 DIAGNOSIS — Z96.89 S/P INSERTION OF SPINAL CORD STIMULATOR: Primary | ICD-10-CM

## 2024-11-19 DIAGNOSIS — M54.50 CHRONIC BILATERAL LOW BACK PAIN, UNSPECIFIED WHETHER SCIATICA PRESENT: ICD-10-CM

## 2024-11-19 DIAGNOSIS — M79.2 PERIPHERAL NEUROPATHIC PAIN: ICD-10-CM

## 2024-11-19 DIAGNOSIS — G89.29 CHRONIC BILATERAL LOW BACK PAIN, UNSPECIFIED WHETHER SCIATICA PRESENT: ICD-10-CM

## 2024-11-19 PROCEDURE — 99024 POSTOP FOLLOW-UP VISIT: CPT

## 2024-11-19 RX ORDER — TRAMADOL HYDROCHLORIDE 50 MG/1
50 TABLET ORAL AS NEEDED
COMMUNITY
Start: 2024-11-07 | End: 2024-12-07

## 2024-11-19 NOTE — PROGRESS NOTES
Referring Physician: No referring provider defined for this encounter.    Primary Physician: Graham Sanchez MBBS    CHIEF COMPLAINT or REASON FOR VISIT: Follow-up (Post permanent spinal cord stimulator implant with [employer]), Back Pain, and Foot Pain (Bilateral)      Initial history of present illness on 06/26/2024:  Mr. Osbaldo Holt is 54 y.o. male who presents as a new patient referral for evaluation and treatment of bilateral foot numbness tingling pain.  Mr. Wells first developed bilateral forefoot and toes paresthesias and burning sensation in approximately 2020/2021 without any inciting or trauma.  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  He underwent evaluation at the time with Dr. Lane, pain management in [place].  He did apparently undergo EMG/NCV in [facility] as well.  He subsequently underwent dorsal root ganglion trial of bilateral L5, S1 nerve roots with Dr. Lane which he states provided good relief of his symptoms.  He is not diabetic.  He has taken pregabalin, Cymbalta with modest benefit.  He subsequently underwent bilateral L5, S1 dorsal root ganglion stimulator permanent implant in 2022 with Dr. Lane.  He reports some good relief after this implant for short few months until he lost efficacy.  Subsequent imaging demonstrated migration of bilateral S1 leads.  Patient then recently followed up with Dr. Roger Shane, pain management, where [employer] device representatives did turn back on his bilateral L5 leads which has produced some improvement in his symptoms however the patient would like better relief.  Dr. Shane referred to me for consideration of DRG revision.    Interval history:  Patient returns to clinic today after undergoing spinal cord stimulator implant with [employer].  He has not noticed significant improvement in his symptoms thus far.  His feet are slightly improved.  He is healing appropriately.  He denies any fever, pain at  his incision sites, drainage, or drainage.  We did discuss appropriate wound care instructions.  I advised him to avoid any soaking in water.  He is to let warm soapy water run over his incisions but to avoid vigorous scrubbing.  We did discuss approximately 1 more month of postoperative restrictions.  [employer] device rep here for reprogramming and questions      Interventions:  9/12/2024: SCS trial [employer]ro with 60-70% relief  10/01/2024: DRG Explantation w/o removal of L5 leads.   11/5/2024: SCS implant [employer]ro (top of T8 and top of T9) with     Objective Pain Scoring:   BRIEF PAIN INVENTORY:  Total score:   Pain Score    11/19/24 1141   PainSc:   7   PainLoc: Foot          PHQ-2:    PHQ-9:    Opioid Risk Tool:         Review of Systems:   ROS negative except as otherwise noted     Past Medical History:   Past Medical History:   Diagnosis Date    Chronic pain disorder 2021    Back legs feet    COPD (chronic obstructive pulmonary disease)     Extremity pain     Fractures 2016    GERD (gastroesophageal reflux disease)     Joint pain     Low back pain     MRSA infection     10 years ago after spider bite     Neck pain     Neuropathy in diabetes     Wears dentures          Past Surgical History:   Past Surgical History:   Procedure Laterality Date    BACK SURGERY  DRG implant    CHOLECYSTECTOMY N/A 04/18/2019    Procedure: CHOLECYSTECTOMY LAPAROSCOPIC;  Surgeon: Sonido Shelton MD;  Location: [facility] OR;  Service: General    COLONOSCOPY      ELBOW PROCEDURE Right     HAND SURGERY Left     from spider bite    SPINAL CORD STIMULATOR IMPLANT  11/05/2024    Dr. Jordan Espino    SPINAL CORD STIMULATOR REMOVAL Bilateral 10/01/2024    bilateral L5, bilateral S1 dorsal Root Ganglion Stimulator explant;Dr. Holbrook.    WRIST SURGERY Right          Family History   Family History   Problem Relation Age of Onset    Cancer Mother     COPD Father     Arthritis Maternal Grandfather     Cancer Maternal Grandfather     Cancer  Maternal Grandmother     Cancer Paternal Grandfather          Social History   Social History     Socioeconomic History    Marital status:    Tobacco Use    Smoking status: Every Day     Current packs/day: 0.50     Average packs/day: 0.7 packs/day for 59.4 years (40.0 ttl pk-yrs)     Types: Cigarettes     Start date: 1/25/1986    Smokeless tobacco: Never    Tobacco comments:     Still smoking   Vaping Use    Vaping status: Some Days    Substances: Nicotine    Devices: Disposable   Substance and Sexual Activity    Alcohol use: Yes     Alcohol/week: 11.0 standard drinks of alcohol     Types: 9 Cans of beer, 2 Drinks containing 0.5 oz of alcohol per week    Drug use: No    Sexual activity: Not Currently     Partners: Female     Birth control/protection: None, Partner of same sex        Medications:     Current Outpatient Medications:     albuterol (PROVENTIL) (2.5 MG/3ML) 0.083% nebulizer solution, INHALE 1 UNIT BY NEBULIZER EVERY 8 HOURS AS NEEDED, Disp: , Rfl:     albuterol sulfate  (90 Base) MCG/ACT inhaler, INHALE 2 PUFFS INTO LUNGS EVERY 4 HOURS AS NEEDED SHORTNESS OF BREATH, Disp: , Rfl:     atorvastatin (LIPITOR) 20 MG tablet, Take  by mouth., Disp: , Rfl:     baclofen (LIORESAL) 10 MG tablet, TAKE ONE TABLET BY MOUTH TWICE DAILY AS NEEDED FOR muscle SPASMS, Disp: 45 tablet, Rfl: 0    celecoxib (CeleBREX) 200 MG capsule, Take 1 capsule by mouth., Disp: , Rfl:     DULoxetine (CYMBALTA) 60 MG capsule, Take 1 capsule by mouth Daily., Disp: , Rfl:     glucose blood test strip, 4x/day, Please provide strips compatible with patient's meter and insurance, Disp: , Rfl:     Jardiance 25 MG tablet tablet, Take 1 tablet by mouth Daily., Disp: , Rfl:     metFORMIN ER (GLUCOPHAGE-XR) 500 MG 24 hr tablet, TAKE TWO TABLETS BY MOUTH TWICE DAILY WITH MEALS DO NOT CRUSH CHEW OR EXPECTORATE, Disp: , Rfl:     Mounjaro 5 MG/0.5ML solution pen-injector pen, , Disp: , Rfl:     mupirocin (BACTROBAN) 2 % nasal ointment,  "Administer 1 Application into the nostril(s) as directed by provider 3 (Three) Times a Day. Start using 5 days prior to surgery., Disp: 15 g, Rfl: 0    omeprazole (priLOSEC) 20 MG capsule, Take 1 capsule by mouth Daily., Disp: , Rfl:     pregabalin (LYRICA) 300 MG capsule, Take 1 capsule by mouth 2 (Two) Times a Day., Disp: , Rfl:     traMADol (ULTRAM) 50 MG tablet, Take 1 tablet by mouth As Needed., Disp: , Rfl:     Trelegy Ellipta 100-62.5-25 MCG/ACT inhaler, INHALE 1 PUFF INTO LUNGS EVERY DAY FOR COPD, Disp: , Rfl:         Physical Exam:     Vitals:    11/19/24 1141   Weight: 111 kg (245 lb)   Height: 185.4 cm (72.99\")   PainSc:   7   PainLoc: Foot            General: Alert and oriented, No acute distress.   HEENT: Normocephalic, atraumatic.   Cardiovascular: No gross edema  Respiratory: Respirations are non-labored      Sensory Exam: Patient does have full sensation in bilateral forefeet however complains of paresthesias/dysesthesia in all toes and bilateral forefeet worse on the plantar surface.      Neurologic: Cranial Nerves II-XII are grossly intact.     Psychiatric: Cooperative.   Gait: Normal   Assistive Devices: None    IPG incision site and percutaneous lead incision site continue to heal appropriately without surrounding erythema, inflammation, induration, or drainage.  We discussed appropriate wound care instructions    Imaging Studies:   No results found for this or any previous visit.        Independent review of radiographic imaging:     Impression & Plan:       06/26/2024: Osbaldo Holt is a 54 y.o. male with past medical history significant for COPD, GERD, bilateral L5, S1 DRG implant complicated by lead migration, peripheral neuropathic pain who presents to the pain clinic for evaluation and treatment of spinal stimulator implant malfunction.  I did personally review images which demonstrated complete migration of the left S1 stimulator lead into the subcutaneous tissue and advancement of the " right S1 stimulator lead into the pelvis.  I presented the patient with the following options:   Reprogramming of current device L5 leads and leaving it as is (of note current lead position outside of the epidural space is NOT MRI compatible)  Revision of the DRG system which would necessitate removing both S1 leads, replacing both S1 leads, accessing IPG site  Dorsal column spinal stimulator trial with HF 10 therapy.  If trial is successful would simultaneously remove DRG system and replace with dorsal column system.    Patient elected to proceed with option #3.  I will therefore obtain psychiatric clearance for dorsal column stimulator trial.  1 week prior to HF 10 trial I will have the patient turn off his dorsal root ganglion stimulator device to reestablish baseline.  I had a discussion with the patient regarding the risks of the procedure including bleeding, infection, damage to surrounding structures.  7/24/2024: Scheduled for SCS trial with [employer] on 9/12/2024.  Will start baclofen.  Discontinue cyclobenzaprine  9/16/2024: Great relief from SCS trial with [employer]ro for painful bilateral peripheral neuropathy.  Will proceed with permanent implant.  Additionally, we will schedule entire DRG stimulator system explant.  Risk of procedure were discussed in great detail at today's appointment.  Risks include bleeding, infection, damage to surrounding structures that could exacerbate symptoms, paralysis, death, lead migration, pocket site pain, failure of device, broken DRG lead which could impact MRI compatibility.  Patient voiced understanding.  Appropriate wound care instructions given  10/08/2024: Wounds continue to heal appropriately after DRG explantation.  Will plan for permanent SCS implant with [employer] on 11/5/2024.  Risk of this procedure include bleeding, infection, demonstrating structures to exacerbate symptoms, lead migration, malfunction of device, paralysis and death.  He voiced understanding  11/19/2024:  2 weeks s/p permanent SCS implant with [employer].  [employer] device rep here for reprogramming.  Incisions healing appropriately.  Continued postoperative restrictions.  Wound care instructions given.     1. S/P insertion of spinal cord stimulator    2. Peripheral neuropathic pain    3. Chronic bilateral low back pain, unspecified whether sciatica present                PLAN:  1. Medication Recommendations: Recommend Voltaren topical, NSAIDs, Tylenol.  Can trial turmeric 500 mg twice daily if NSAID contraindicated.  Do not recommend opioids for use of withdrawal, dependence, immunosuppression, hormonal imbalances.  Additionally, this can make his recovery more difficult perioperatively  -Continue baclofen      2. Physical Therapy: Continue HEP    3. Psychological: Psych clearance obtained    4. Complementary and alternative (CAM) Therapies:     5. Labs/Diagnostic studies: None indicated   -Patient's last HbA1c was 9.8.  He is at an increased risk of infection and postoperative complications secondary to this.  I did encourage him to reach out if he has any change in his symptoms including increased pain, redness, swelling, drainage, or fever.  He voiced understanding    6. Imagin. Interventions: 2 weeks s/p permanent SCS implant with [employer].  Incisions healing appropriately.  [employer] device rep here for reprogramming    8. Referrals:     9. Records:     10. Lifestyle goals:    Follow-up 1 month; sooner if clinically dictated      [facility]  Brent Arceo PA-C          Quality Metrics:

## 2025-01-02 ENCOUNTER — TELEPHONE (OUTPATIENT)
Dept: PAIN MEDICINE | Facility: CLINIC | Age: 55
End: 2025-01-02
Payer: COMMERCIAL

## 2025-01-02 NOTE — TELEPHONE ENCOUNTER
S/w patient, advised him he can see the device reps with [employer] without having an office visit. Scheduled him for follow up in May.

## 2025-01-02 NOTE — TELEPHONE ENCOUNTER
----- Message from Jordan Holbrook sent at 12/31/2024 10:02 AM EST -----  Regarding: RE:  If he is saying he's doing fine and can't afford an office visit then schedule him for a 6 month visit and instruct him to call if any issues. Also reach out to rep to meet with him for reprogram.  ----- Message -----  From: Brent Arceo PA-C  Sent: 12/31/2024   8:21 AM EST  To: Chantal Martínez MA; Jordan Holbrook MD; #  Subject: RE:                                              He was implanted at the beginning of November. We have not seen him since his office visit which was 2 weeks after the procedure.  ----- Message -----  From: Jordan Holbrook MD  Sent: 12/31/2024   8:18 AM EST  To: Chantal Martínez MA; Cecilia Roberto MA; #  Subject: RE:                                              How far out is he from implant? We might be able to just have the rep meet him but not bill a visit  ----- Message -----  From: Brent Arceo PA-C  Sent: 12/30/2024   3:58 PM EST  To: Chantal Martínez MA; Jordan Holbrook MD; #  Subject: RE:                                              I am glad the patient is feeling better.  It is important that we schedule follow-up visits for patients that undergo permanently implanted devices so we can monitor their progress and healing.  ----- Message -----  From: Cecilia Roberto MA  Sent: 12/30/2024   3:51 PM EST  To: Chantal Martínez MA; Brent Arceo PA-C    Patient declined to come in and be seen because he said he was doing okay and cannot afford to come in.  ----- Message -----  From: Brent Arceo PA-C  Sent: 12/30/2024   8:35 AM EST  To: Chantal Martínez MA; Cecilia Roberto MA    Can we please contact this patient to get his follow-up visit rescheduled?  Thanks

## 2025-05-19 ENCOUNTER — OFFICE VISIT (OUTPATIENT)
Dept: PAIN MEDICINE | Facility: CLINIC | Age: 55
End: 2025-05-19
Payer: COMMERCIAL

## 2025-05-19 VITALS — BODY MASS INDEX: 29.69 KG/M2 | HEIGHT: 73 IN | WEIGHT: 224 LBS

## 2025-05-19 DIAGNOSIS — T85.192S FAILED SPINAL CORD STIMULATOR, SEQUELA: ICD-10-CM

## 2025-05-19 DIAGNOSIS — Z96.89 S/P INSERTION OF SPINAL CORD STIMULATOR: Primary | ICD-10-CM

## 2025-05-19 DIAGNOSIS — M79.2 PERIPHERAL NEUROPATHIC PAIN: ICD-10-CM

## 2025-05-19 DIAGNOSIS — M54.50 CHRONIC BILATERAL LOW BACK PAIN, UNSPECIFIED WHETHER SCIATICA PRESENT: ICD-10-CM

## 2025-05-19 DIAGNOSIS — G89.29 CHRONIC BILATERAL LOW BACK PAIN, UNSPECIFIED WHETHER SCIATICA PRESENT: ICD-10-CM

## 2025-05-19 PROCEDURE — 99213 OFFICE O/P EST LOW 20 MIN: CPT

## 2025-05-19 NOTE — PROGRESS NOTES
Referring Physician: No referring provider defined for this encounter.    Primary Physician: Graham Sanchez MBBS    CHIEF COMPLAINT or REASON FOR VISIT: Follow-up and S/P insertion of spinal cord stimulator      Initial history of present illness on 06/26/2024:  Mr. Osbaldo Holt is 54 y.o. male who presents as a new patient referral for evaluation and treatment of bilateral foot numbness tingling pain.  Mr. Wells first developed bilateral forefoot and toes paresthesias and burning sensation in approximately 2020/2021 without any inciting or trauma.  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  He underwent evaluation at the time with Dr. Lane, pain management in [place].  He did apparently undergo EMG/NCV in [facility] as well.  He subsequently underwent dorsal root ganglion trial of bilateral L5, S1 nerve roots with Dr. Lane which he states provided good relief of his symptoms.  He is not diabetic.  He has taken pregabalin, Cymbalta with modest benefit.  He subsequently underwent bilateral L5, S1 dorsal root ganglion stimulator permanent implant in 2022 with Dr. Lane.  He reports some good relief after this implant for short few months until he lost efficacy.  Subsequent imaging demonstrated migration of bilateral S1 leads.  Patient then recently followed up with Dr. Roger Shane, pain management, where [employer] device representatives did turn back on his bilateral L5 leads which has produced some improvement in his symptoms however the patient would like better relief.  Dr. Shane referred to me for consideration of DRG revision.    Interval history:  Patient returns to clinic today.  He is approximately 6 months out from a permanent spinal cord stimulator implant with [employer].  Today, he primarily complains of chronic bilateral neuropathic foot pain.  He reports he is receiving less relief from the permanent implant that he was the trial.  He has not had  his device interrogated or reprogrammed since his last office visit in November.  He did recover from this procedure appropriately without any fever, chills, drainage..  He has gone back to work and has noticed an increase in his symptoms.  He has having some right periscapular pain since returning to work.  He has purposefully lost 60 pounds since his last office visit.  He has lowered his HbA1c to 7.5.  He was evaluated by Dr. Shane at [facility] spine on 3/26/2025 regarding chronic bilateral knee pain where there were plans regarding bilateral Visco supplemental injections      Interventions:  9/12/2024: SCS trial Nevro with 60-70% relief  10/01/2024: DRG Explantation w/o removal of L5 leads.   11/5/2024: SCS implant Nevro (top of T8 and top of T9) with 60% relief ongoing    Objective Pain Scoring:   BRIEF PAIN INVENTORY:  Total score:   Pain Score    05/19/25 0803   PainSc: 9    PainLoc: Foot            PHQ-2:    PHQ-9:    Opioid Risk Tool:         Review of Systems:   ROS negative except as otherwise noted     Past Medical History:   Past Medical History:   Diagnosis Date    Chronic pain disorder 2021    Back legs feet    COPD (chronic obstructive pulmonary disease)     Extremity pain     Fractures 2016    GERD (gastroesophageal reflux disease)     Joint pain     Low back pain     MRSA infection     10 years ago after spider bite     Neck pain     Neuropathy in diabetes     Torn rotator cuff 2025    Right arm    Wears dentures          Past Surgical History:   Past Surgical History:   Procedure Laterality Date    BACK SURGERY  DRG implant    CHOLECYSTECTOMY N/A 04/18/2019    Procedure: CHOLECYSTECTOMY LAPAROSCOPIC;  Surgeon: Sonido Shelton MD;  [facility]  Service: General    COLONOSCOPY      ELBOW PROCEDURE Right     HAND SURGERY Left     from spider bite    SPINAL CORD STIMULATOR IMPLANT  11/05/2024    Dr. Jordan Espino    SPINAL CORD STIMULATOR REMOVAL Bilateral  10/01/2024    bilateral L5, bilateral S1 dorsal Root Ganglion Stimulator explant;Dr. Holbrook.    TRIGGER POINT INJECTION      WRIST SURGERY Right          Family History   Family History   Problem Relation Age of Onset    Cancer Mother     COPD Father     Arthritis Maternal Grandfather     Cancer Maternal Grandfather     Cancer Maternal Grandmother     Cancer Paternal Grandfather          Social History   Social History     Socioeconomic History    Marital status:    Tobacco Use    Smoking status: Every Day     Current packs/day: 0.50     Average packs/day: 0.7 packs/day for 59.9 years (40.3 ttl pk-yrs)     Types: Cigarettes     Start date: 1/25/1986    Smokeless tobacco: Never    Tobacco comments:     Still smoking   Vaping Use    Vaping status: Some Days    Substances: Nicotine    Devices: Disposable   Substance and Sexual Activity    Alcohol use: Yes     Alcohol/week: 11.0 standard drinks of alcohol     Types: 9 Cans of beer, 2 Drinks containing 0.5 oz of alcohol per week    Drug use: No    Sexual activity: Not Currently     Partners: Female, Male     Birth control/protection: None, Partner of same sex        Medications:     Current Outpatient Medications:     albuterol (PROVENTIL) (2.5 MG/3ML) 0.083% nebulizer solution, INHALE 1 UNIT BY NEBULIZER EVERY 8 HOURS AS NEEDED, Disp: , Rfl:     albuterol sulfate  (90 Base) MCG/ACT inhaler, INHALE 2 PUFFS INTO LUNGS EVERY 4 HOURS AS NEEDED SHORTNESS OF BREATH, Disp: , Rfl:     atorvastatin (LIPITOR) 20 MG tablet, Take  by mouth., Disp: , Rfl:     baclofen (LIORESAL) 10 MG tablet, TAKE ONE TABLET BY MOUTH TWICE DAILY AS NEEDED FOR muscle SPASMS, Disp: 45 tablet, Rfl: 0    celecoxib (CeleBREX) 200 MG capsule, Take 1 capsule by mouth., Disp: , Rfl:     DULoxetine (CYMBALTA) 60 MG capsule, Take 1 capsule by mouth Daily., Disp: , Rfl:     glucose blood test strip, 4x/day, Please provide strips compatible with patient's meter and insurance, Disp: , Rfl:      "Jardiance 25 MG tablet tablet, Take 1 tablet by mouth Daily., Disp: , Rfl:     metFORMIN ER (GLUCOPHAGE-XR) 500 MG 24 hr tablet, TAKE TWO TABLETS BY MOUTH TWICE DAILY WITH MEALS DO NOT CRUSH CHEW OR EXPECTORATE, Disp: , Rfl:     Mounjaro 5 MG/0.5ML solution pen-injector pen, , Disp: , Rfl:     mupirocin (BACTROBAN) 2 % nasal ointment, Administer 1 Application into the nostril(s) as directed by provider 3 (Three) Times a Day. Start using 5 days prior to surgery., Disp: 15 g, Rfl: 0    omeprazole (priLOSEC) 20 MG capsule, Take 1 capsule by mouth Daily., Disp: , Rfl:     PredniSONE 5 MG tablet therapy pack dosepak, Take  by mouth. Take as directed on package instructions., Disp: , Rfl:     pregabalin (LYRICA) 300 MG capsule, Take 1 capsule by mouth 2 (Two) Times a Day., Disp: , Rfl:     Trelegy Ellipta 100-62.5-25 MCG/ACT inhaler, INHALE 1 PUFF INTO LUNGS EVERY DAY FOR COPD, Disp: , Rfl:         Physical Exam:     Vitals:    05/19/25 0803   Weight: 102 kg (224 lb)   Height: 185.4 cm (72.99\")   PainSc: 9    PainLoc: Foot              General: Alert and oriented, No acute distress.   HEENT: Normocephalic, atraumatic.   Cardiovascular: No gross edema  Respiratory: Respirations are non-labored      Sensory Exam: Patient does have full sensation in bilateral forefeet however complains of paresthesias/dysesthesia in all toes and bilateral forefeet worse on the plantar surface.    Right periscapular border tender to palpation    Neurologic: Cranial Nerves II-XII are grossly intact.     Psychiatric: Cooperative.   Gait: Normal   Assistive Devices: None    IPG and percutaneous lead incision site have healed appropriately.  Incisions are clean dry and intact.  No signs of surrounding infection    Imaging Studies:   No results found for this or any previous visit.        Independent review of radiographic imaging:     Impression & Plan:       06/26/2024: Osbaldo Holt is a 54 y.o. male with past medical history significant for " COPD, GERD, bilateral L5, S1 DRG implant complicated by lead migration, peripheral neuropathic pain who presents to the pain clinic for evaluation and treatment of spinal stimulator implant malfunction.  I did personally review images which demonstrated complete migration of the left S1 stimulator lead into the subcutaneous tissue and advancement of the right S1 stimulator lead into the pelvis.  I presented the patient with the following options:   Reprogramming of current device L5 leads and leaving it as is (of note current lead position outside of the epidural space is NOT MRI compatible)  Revision of the DRG system which would necessitate removing both S1 leads, replacing both S1 leads, accessing IPG site  Dorsal column spinal stimulator trial with HF 10 therapy.  If trial is successful would simultaneously remove DRG system and replace with dorsal column system.    Patient elected to proceed with option #3.  I will therefore obtain psychiatric clearance for dorsal column stimulator trial.  1 week prior to HF 10 trial I will have the patient turn off his dorsal root ganglion stimulator device to reestablish baseline.  I had a discussion with the patient regarding the risks of the procedure including bleeding, infection, damage to surrounding structures.  7/24/2024: Scheduled for SCS trial with Nevro on 9/12/2024.  Will start baclofen.  Discontinue cyclobenzaprine  9/16/2024: Great relief from SCS trial with Nevro for painful bilateral peripheral neuropathy.  Will proceed with permanent implant.  Additionally, we will schedule entire DRG stimulator system explant.  Risk of procedure were discussed in great detail at today's appointment.  Risks include bleeding, infection, damage to surrounding structures that could exacerbate symptoms, paralysis, death, lead migration, pocket site pain, failure of device, broken DRG lead which could impact MRI compatibility.  Patient voiced understanding.  Appropriate wound care  instructions given  10/08/2024: Wounds continue to heal appropriately after DRG explantation.  Will plan for permanent SCS implant with Nevro on 2024.  Risk of this procedure include bleeding, infection, demonstrating structures to exacerbate symptoms, lead migration, malfunction of device, paralysis and death.  He voiced understanding  2024: 2 weeks s/p permanent SCS implant with Nevro.  Nevro device rep here for reprogramming.  Incisions healing appropriately.  Continued postoperative restrictions.  Wound care instructions given.   2025: 6 months s/p permanent SCS implant with Nevro.  Nevro device rep here for reprogramming and questions.    1. S/P insertion of spinal cord stimulator    2. Peripheral neuropathic pain    3. Chronic bilateral low back pain, unspecified whether sciatica present    4. Failed spinal cord stimulator, sequela                  PLAN:  1. Medication Recommendations: Recommend Voltaren topical, NSAIDs, Tylenol.  Can trial turmeric 500 mg twice daily if NSAID contraindicated.       2. Physical Therapy: Continue HEP    3. Psychological:    4. Complementary and alternative (CAM) Therapies:     5. Labs/Diagnostic studies: None indicated   -Patient's last HbA1c was 7.5  6. Imagin. Interventions: 6 months s/p permanent SCS implant with Nevro.  Nevro device rep here for questions and reprogramming.    8. Referrals:     9. Records:     10. Lifestyle goals:    Follow-up 1 month in [facility]  Brent Arceo PA-C          Quality Metrics:

## 2025-06-13 ENCOUNTER — OFFICE VISIT (OUTPATIENT)
Dept: PAIN MEDICINE | Facility: CLINIC | Age: 55
End: 2025-06-13
Payer: COMMERCIAL

## 2025-06-13 VITALS — WEIGHT: 225 LBS | BODY MASS INDEX: 29.82 KG/M2 | HEIGHT: 73 IN

## 2025-06-13 DIAGNOSIS — Z96.89 S/P INSERTION OF SPINAL CORD STIMULATOR: Primary | ICD-10-CM

## 2025-06-13 DIAGNOSIS — M79.2 PERIPHERAL NEUROPATHIC PAIN: ICD-10-CM

## 2025-06-13 DIAGNOSIS — M54.50 CHRONIC BILATERAL LOW BACK PAIN, UNSPECIFIED WHETHER SCIATICA PRESENT: ICD-10-CM

## 2025-06-13 DIAGNOSIS — G89.29 CHRONIC BILATERAL LOW BACK PAIN, UNSPECIFIED WHETHER SCIATICA PRESENT: ICD-10-CM

## 2025-06-13 RX ORDER — CEPHALEXIN 500 MG/1
CAPSULE ORAL
COMMUNITY
Start: 2025-06-04

## 2025-06-13 RX ORDER — DOXYCYCLINE 100 MG/1
1 CAPSULE ORAL EVERY 12 HOURS SCHEDULED
COMMUNITY
Start: 2025-06-07

## 2025-06-13 RX ORDER — AZITHROMYCIN 250 MG/1
TABLET, FILM COATED ORAL
COMMUNITY
Start: 2025-06-04

## 2025-06-13 NOTE — PROGRESS NOTES
Referring Physician: No referring provider defined for this encounter.    Primary Physician: Graham Sanchez MBBS    CHIEF COMPLAINT or REASON FOR VISIT: Peripheral Neuropathy and Follow-up      Initial history of present illness on 06/26/2024:  Mr. Osbaldo Holt is 54 y.o. male who presents as a new patient referral for evaluation and treatment of bilateral foot numbness tingling pain.  Mr. Wells first developed bilateral forefoot and toes paresthesias and burning sensation in approximately 2020/2021 without any inciting or trauma.  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  He underwent evaluation at the time with Dr. Lane, pain management in [place].  He did apparently undergo EMG/NCV in [facility] as well.  He subsequently underwent dorsal root ganglion trial of bilateral L5, S1 nerve roots with Dr. Lane which he states provided good relief of his symptoms.  He is not diabetic.  He has taken pregabalin, Cymbalta with modest benefit.  He subsequently underwent bilateral L5, S1 dorsal root ganglion stimulator permanent implant in 2022 with Dr. Lane.  He reports some good relief after this implant for short few months until he lost efficacy.  Subsequent imaging demonstrated migration of bilateral S1 leads.  Patient then recently followed up with Dr. Roger Shane, pain management, where [employer] device representatives did turn back on his bilateral L5 leads which has produced some improvement in his symptoms however the patient would like better relief.  Dr. Shane referred to me for consideration of DRG revision.    Interval history:  Patient returns to clinic today for ongoing management of his spinal cord stimulator.  He continues to complain of chronic bilateral foot numbness and tingling pain.  He has started working again which has exacerbated his symptoms since he has to wear tennis shoes.  He has not received the same relief from permanent implant  as he did from his trial.  He is to recharge his IPG approximately once a week.    Interventions:  9/12/2024: SCS trial Nevro with 60-70% relief  10/01/2024: DRG Explantation w/o removal of L5 leads.   11/5/2024: SCS implant Nevro (top of T8 and top of T9) with 35-45% relief ongoing    Objective Pain Scoring:   BRIEF PAIN INVENTORY:  Total score:   Pain Score    06/13/25 1020   PainSc: 7    PainLoc: Foot              PHQ-2:    PHQ-9:    Opioid Risk Tool:         Review of Systems:   ROS negative except as otherwise noted     Past Medical History:   Past Medical History:   Diagnosis Date    Chronic pain disorder 2021    Back legs feet    COPD (chronic obstructive pulmonary disease)     Extremity pain     Fractures 2016    GERD (gastroesophageal reflux disease)     Joint pain     Low back pain     MRSA infection     10 years ago after spider bite     Neck pain     Neuropathy in diabetes     Torn rotator cuff 2025    Right arm    Wears dentures          Past Surgical History:   Past Surgical History:   Procedure Laterality Date    APPENDECTOMY N/A 04/2025    BACK SURGERY  DRG implant    CHOLECYSTECTOMY N/A 04/18/2019    Procedure: CHOLECYSTECTOMY LAPAROSCOPIC;  Surgeon: Sonido Shelton MD;  Location: [facility];  Service: General    COLONOSCOPY      ELBOW PROCEDURE Right     HAND SURGERY Left     from spider bite    SPINAL CORD STIMULATOR IMPLANT  11/05/2024    Dr. Jordan Espino    SPINAL CORD STIMULATOR REMOVAL Bilateral 10/01/2024    bilateral L5, bilateral S1 dorsal Root Ganglion Stimulator explant;Dr. oHlbrook.    TRIGGER POINT INJECTION      WRIST SURGERY Right          Family History   Family History   Problem Relation Age of Onset    Cancer Mother     COPD Father     Arthritis Maternal Grandfather     Cancer Maternal Grandfather     Cancer Maternal Grandmother     Cancer Paternal Grandfather          Social History   Social History     Socioeconomic History    Marital status:    Tobacco Use     Smoking status: Every Day     Current packs/day: 0.50     Average packs/day: 0.7 packs/day for 60.0 years (40.3 ttl pk-yrs)     Types: Cigarettes     Start date: 1/25/1986    Smokeless tobacco: Never    Tobacco comments:     Still smoking   Vaping Use    Vaping status: Some Days    Substances: Nicotine    Devices: Disposable   Substance and Sexual Activity    Alcohol use: Yes     Alcohol/week: 11.0 standard drinks of alcohol     Types: 9 Cans of beer, 2 Drinks containing 0.5 oz of alcohol per week    Drug use: No    Sexual activity: Not Currently     Partners: Female, Male     Birth control/protection: None, Partner of same sex        Medications:     Current Outpatient Medications:     albuterol (PROVENTIL) (2.5 MG/3ML) 0.083% nebulizer solution, INHALE 1 UNIT BY NEBULIZER EVERY 8 HOURS AS NEEDED, Disp: , Rfl:     albuterol sulfate  (90 Base) MCG/ACT inhaler, INHALE 2 PUFFS INTO LUNGS EVERY 4 HOURS AS NEEDED SHORTNESS OF BREATH, Disp: , Rfl:     atorvastatin (LIPITOR) 20 MG tablet, Take  by mouth., Disp: , Rfl:     azithromycin (ZITHROMAX) 250 MG tablet, TAKE 2 TABLETS BY MOUTH ON DAY 1, THEN TAKE 1 TABLET DAILY ON DAYS 2-5, Disp: , Rfl:     celecoxib (CeleBREX) 200 MG capsule, Take 1 capsule by mouth., Disp: , Rfl:     cephalexin (KEFLEX) 500 MG capsule, TAKE TWO CAPSULES BY MOUTH TWICE DAILY FOR 10 DAYS, Disp: , Rfl:     doxycycline (VIBRAMYCIN) 100 MG capsule, Take 1 capsule by mouth Every 12 (Twelve) Hours., Disp: , Rfl:     DULoxetine (CYMBALTA) 60 MG capsule, Take 1 capsule by mouth Daily., Disp: , Rfl:     glucose blood test strip, 4x/day, Please provide strips compatible with patient's meter and insurance, Disp: , Rfl:     Jardiance 25 MG tablet tablet, Take 1 tablet by mouth Daily., Disp: , Rfl:     metFORMIN ER (GLUCOPHAGE-XR) 500 MG 24 hr tablet, TAKE TWO TABLETS BY MOUTH TWICE DAILY WITH MEALS DO NOT CRUSH CHEW OR EXPECTORATE, Disp: , Rfl:     Mounjaro 5 MG/0.5ML solution pen-injector pen, ,  "Disp: , Rfl:     omeprazole (priLOSEC) 20 MG capsule, Take 1 capsule by mouth Daily., Disp: , Rfl:     pregabalin (LYRICA) 300 MG capsule, Take 1 capsule by mouth 2 (Two) Times a Day., Disp: , Rfl:     Trelegy Ellipta 100-62.5-25 MCG/ACT inhaler, INHALE 1 PUFF INTO LUNGS EVERY DAY FOR COPD, Disp: , Rfl:     baclofen (LIORESAL) 10 MG tablet, TAKE ONE TABLET BY MOUTH TWICE DAILY AS NEEDED FOR muscle SPASMS (Patient not taking: Reported on 6/13/2025), Disp: 45 tablet, Rfl: 0    mupirocin (BACTROBAN) 2 % nasal ointment, Administer 1 Application into the nostril(s) as directed by provider 3 (Three) Times a Day. Start using 5 days prior to surgery. (Patient not taking: Reported on 6/13/2025), Disp: 15 g, Rfl: 0    PredniSONE 5 MG tablet therapy pack dosepak, Take  by mouth. Take as directed on package instructions. (Patient not taking: Reported on 6/13/2025), Disp: , Rfl:         Physical Exam:     Vitals:    06/13/25 1020   Weight: 102 kg (225 lb)   Height: 185.4 cm (73\")   PainSc: 7    PainLoc: Foot                General: Alert and oriented, No acute distress.   HEENT: Normocephalic, atraumatic.   Cardiovascular: No gross edema  Respiratory: Respirations are non-labored      Sensory Exam: Patient does have full sensation in bilateral forefeet however complains of paresthesias/dysesthesia in all toes and bilateral forefeet worse on the plantar surface.    Right periscapular border tender to palpation    Neurologic: Cranial Nerves II-XII are grossly intact.     Psychiatric: Cooperative.   Gait: Normal   Assistive Devices: None    IPG and percutaneous lead incision site have healed appropriately.  Incisions are clean dry and intact.  No signs of surrounding infection    Imaging Studies:   No results found for this or any previous visit.        Independent review of radiographic imaging:     Impression & Plan:       06/26/2024: Osbaldo Holt is a 54 y.o. male with past medical history significant for COPD, GERD, bilateral " L5, S1 DRG implant complicated by lead migration, peripheral neuropathic pain who presents to the pain clinic for evaluation and treatment of spinal stimulator implant malfunction.  I did personally review images which demonstrated complete migration of the left S1 stimulator lead into the subcutaneous tissue and advancement of the right S1 stimulator lead into the pelvis.  I presented the patient with the following options:   Reprogramming of current device L5 leads and leaving it as is (of note current lead position outside of the epidural space is NOT MRI compatible)  Revision of the DRG system which would necessitate removing both S1 leads, replacing both S1 leads, accessing IPG site  Dorsal column spinal stimulator trial with HF 10 therapy.  If trial is successful would simultaneously remove DRG system and replace with dorsal column system.    Patient elected to proceed with option #3.  I will therefore obtain psychiatric clearance for dorsal column stimulator trial.  1 week prior to HF 10 trial I will have the patient turn off his dorsal root ganglion stimulator device to reestablish baseline.  I had a discussion with the patient regarding the risks of the procedure including bleeding, infection, damage to surrounding structures.  7/24/2024: Scheduled for SCS trial with Nevro on 9/12/2024.  Will start baclofen.  Discontinue cyclobenzaprine  9/16/2024: Great relief from SCS trial with Nevro for painful bilateral peripheral neuropathy.  Will proceed with permanent implant.  Additionally, we will schedule entire DRG stimulator system explant.  Risk of procedure were discussed in great detail at today's appointment.  Risks include bleeding, infection, damage to surrounding structures that could exacerbate symptoms, paralysis, death, lead migration, pocket site pain, failure of device, broken DRG lead which could impact MRI compatibility.  Patient voiced understanding.  Appropriate wound care instructions  given  10/08/2024: Wounds continue to heal appropriately after DRG explantation.  Will plan for permanent SCS implant with Nevro on 11/5/2024.  Risk of this procedure include bleeding, infection, demonstrating structures to exacerbate symptoms, lead migration, malfunction of device, paralysis and death.  He voiced understanding  11/19/2024: 2 weeks s/p permanent SCS implant with Nevro.  Nevro device rep here for reprogramming.  Incisions healing appropriately.  Continued postoperative restrictions.  Wound care instructions given.   5/19/2025: 6 months s/p permanent SCS implant with Nevro.  Nevro device rep here for reprogramming and questions.  6/13/2025: Modest relief from spinal cord stimulator implant with Nevro.  Will obtain thoracic x-ray to rule out potential lead migration.  Nevro device rep here for questions and reprogramming.    1. S/P insertion of spinal cord stimulator    2. Peripheral neuropathic pain    3. Chronic bilateral low back pain, unspecified whether sciatica present                    PLAN:  1. Medication Recommendations: Recommend Voltaren topical, NSAIDs, Tylenol.  Can trial turmeric 500 mg twice daily if NSAID contraindicated.       2. Physical Therapy: Continue HEP    3. Psychological:    4. Complementary and alternative (CAM) Therapies:     5. Labs/Diagnostic studies: None indicated   -Patient's last HbA1c was 7.5  6. Imaging: Obtain thoracic radiograph to rule out any potential lead migration    7. Interventions: 7 months s/p permanent SCS implant with Nevro.  Nevro device rep here for questions and reprogramming.    8. Referrals:     9. Records:     10. Lifestyle goals:    Follow-up 6 weeks      [facility]  Brent Arceo PA-C          Quality Metrics:   Olanzapine Counseling- I discussed with the patient the common side effects of olanzapine including but are not limited to: lack of energy, dry mouth, increased appetite, sleepiness, tremor, constipation, dizziness, changes in behavior, or restlessness.  Explained that teenagers are more likely to experience headaches, abdominal pain, pain in the arms or legs, tiredness, and sleepiness.  Serious side effects include but are not limited: increased risk of death in elderly patients who are confused, have memory loss, or dementia-related psychosis; hyperglycemia; increased cholesterol and triglycerides; and weight gain.

## 2025-06-19 ENCOUNTER — TELEPHONE (OUTPATIENT)
Dept: PAIN MEDICINE | Facility: CLINIC | Age: 55
End: 2025-06-19
Payer: COMMERCIAL

## 2025-06-19 NOTE — TELEPHONE ENCOUNTER
Attempted to call back to reviewed Xray results that leads of SCS are in appropriate position. No voicemail set up

## (undated) DEVICE — ENDOPATH XCEL BLADELESS TROCARS WITH STABILITY SLEEVES: Brand: ENDOPATH XCEL

## (undated) DEVICE — SCOPE WARMER THAT PRE-WARMS MULTIPLE LAPAROSCOPES.: Brand: JOSNOE MEDICAL INC

## (undated) DEVICE — SUT VIC 0 UR6 27IN VCP603H

## (undated) DEVICE — SUT MNCRYL PLS ANTIB UD 4/0 PS2 18IN

## (undated) DEVICE — [HIGH FLOW HEATED INSUFFLATOR TUBING,  DO NOT USE IF PACKAGE IS DAMAGED]

## (undated) DEVICE — 2, DISPOSABLE SUCTION/IRRIGATOR WITHOUT DISPOSABLE TIP: Brand: STRYKEFLOW

## (undated) DEVICE — COVER,LIGHT HANDLE,FLX,1/PK: Brand: MEDLINE INDUSTRIES, INC.

## (undated) DEVICE — ENDOPATH XCEL UNIVERSAL TROCAR STABLILITY SLEEVES: Brand: ENDOPATH XCEL

## (undated) DEVICE — INTENDED FOR TISSUE SEPARATION, AND OTHER PROCEDURES THAT REQUIRE A SHARP SURGICAL BLADE TO PUNCTURE OR CUT.: Brand: BARD-PARKER ® STAINLESS STEEL BLADES

## (undated) DEVICE — MINI ENDOCUT SCISSOR TIP, DISPOSABLE: Brand: RENEW

## (undated) DEVICE — ENDOPOUCH RETRIEVER SPECIMEN RETRIEVAL BAGS: Brand: ENDOPOUCH RETRIEVER

## (undated) DEVICE — ENCORE® LATEX MICRO SIZE 8, STERILE LATEX POWDER-FREE SURGICAL GLOVE: Brand: ENCORE

## (undated) DEVICE — ENDOPATH 10MM ENDOSCOPIC BLUNT CHERRY DISSECTORS (12 POUCHES CONTAINING 3 DISSECTORS EACH): Brand: ENDOPATH

## (undated) DEVICE — PK LAP LASR CHOLE 10

## (undated) DEVICE — ENDOPATH XCEL BLUNT TIP TROCARS WITH SMOOTH SLEEVES: Brand: ENDOPATH XCEL

## (undated) DEVICE — SUT VIC PLS CTD ANTIB 2/0 18IN VCP111G

## (undated) DEVICE — ENCORE® LATEX MICRO SIZE 7.5, STERILE LATEX POWDER-FREE SURGICAL GLOVE: Brand: ENCORE